# Patient Record
Sex: MALE | Race: WHITE | NOT HISPANIC OR LATINO | Employment: OTHER | URBAN - METROPOLITAN AREA
[De-identification: names, ages, dates, MRNs, and addresses within clinical notes are randomized per-mention and may not be internally consistent; named-entity substitution may affect disease eponyms.]

---

## 2019-01-22 ENCOUNTER — OFFICE VISIT (OUTPATIENT)
Dept: GASTROENTEROLOGY | Facility: CLINIC | Age: 69
End: 2019-01-22
Payer: MEDICARE

## 2019-01-22 VITALS
HEIGHT: 71 IN | SYSTOLIC BLOOD PRESSURE: 130 MMHG | WEIGHT: 178.13 LBS | RESPIRATION RATE: 18 BRPM | DIASTOLIC BLOOD PRESSURE: 79 MMHG | BODY MASS INDEX: 24.94 KG/M2 | HEART RATE: 87 BPM

## 2019-01-22 DIAGNOSIS — Z86.010 HISTORY OF COLON POLYPS: ICD-10-CM

## 2019-01-22 DIAGNOSIS — K92.1 HEMATOCHEZIA: Primary | ICD-10-CM

## 2019-01-22 DIAGNOSIS — Z92.3 HISTORY OF RADIATION THERAPY: ICD-10-CM

## 2019-01-22 PROCEDURE — 99203 PR OFFICE/OUTPT VISIT, NEW, LEVL III, 30-44 MIN: ICD-10-PCS | Mod: S$GLB,,, | Performed by: NURSE PRACTITIONER

## 2019-01-22 PROCEDURE — 99999 PR PBB SHADOW E&M-NEW PATIENT-LVL III: CPT | Mod: PBBFAC,,, | Performed by: NURSE PRACTITIONER

## 2019-01-22 PROCEDURE — 99999 PR PBB SHADOW E&M-NEW PATIENT-LVL III: ICD-10-PCS | Mod: PBBFAC,,, | Performed by: NURSE PRACTITIONER

## 2019-01-22 PROCEDURE — 99203 OFFICE O/P NEW LOW 30 MIN: CPT | Mod: S$GLB,,, | Performed by: NURSE PRACTITIONER

## 2019-01-22 NOTE — PROGRESS NOTES
Subjective:       Patient ID: Josafat Rodrigues is a 69 y.o. male Body mass index is 24.84 kg/m².    Chief Complaint: Follow-up (consult, blood in stool for years, hx of colon polyps)    This patient is new to me.    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 Primary symptoms do not include fever, weight loss, fatigue, abdominal pain, nausea, vomiting, diarrhea, melena, hematemesis or dysuria.   The illness does not include chills, dysphagia, odynophagia or constipation. Associated symptoms comments: Bowel movements once daily of formed stool. Significant associated medical issues include hemorrhoids (denies any problems currently). Associated medical issues do not include bowel resection.     Review of Systems   Constitutional: Negative for appetite change, chills, fatigue, fever and weight loss.   HENT: Negative for sore throat and trouble swallowing.    Respiratory: Negative for cough, choking and shortness of breath.    Cardiovascular: Negative for chest pain.   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 Negative for abdominal pain, constipation, diarrhea, dysphagia, hematemesis, melena, nausea, rectal pain and vomiting.   Genitourinary: Negative for difficulty urinating, dysuria and flank pain.   Neurological: Negative for weakness.       Past Medical History:   Diagnosis Date    Cancer     testicular tumor- had radiation    GERD (gastroesophageal reflux disease)      Past Surgical History:   Procedure Laterality Date     COLONOSCOPY  02/02/2004    Dr. Blanchard, in legacy: rectal colon polyp removed, erythema to rectum; biopsy report in legacy    HERNIA REPAIR      TONSILLECTOMY       Family History   Problem Relation Age of Onset    Arthritis Mother     Hearing loss Mother     Vision loss Mother     Cancer Father     Colon cancer Neg Hx     Ulcerative colitis Neg Hx     Stomach cancer Neg Hx     Esophageal cancer Neg Hx     Crohn's disease Neg Hx      Wt Readings from Last 10 Encounters:   01/22/19 80.8 kg (178 lb 2.1 oz)   04/08/15 79.8 kg (176 lb)   04/03/15 79.8 kg (176 lb)   11/14/13 80.7 kg (178 lb)   09/28/12 76.4 kg (168 lb 6.4 oz)     Objective:      Physical Exam   Constitutional: He is oriented to person, place, and time. He appears well-developed and well-nourished. No distress.   HENT:   Mouth/Throat: Oropharynx is clear and moist and mucous membranes are normal. No oral lesions. No oropharyngeal exudate.   Eyes: Conjunctivae are normal. Pupils are equal, round, and reactive to light. No scleral icterus.   Cardiovascular: Normal rate.   Pulmonary/Chest: Effort normal and breath sounds normal. No respiratory distress. He has no wheezes.   Abdominal: Soft. Normal appearance and bowel sounds are normal. He exhibits no distension, no abdominal bruit and no mass. There is no tenderness. There is no rigidity, no rebound, no guarding, no tenderness at McBurney's point and negative Patel's sign.   Patient declined rectal exam.   Neurological: He is alert and oriented to person, place, and time.   Skin: Skin is warm and dry. No rash noted. He is not diaphoretic. No erythema. No pallor.   Non-jaundiced   Psychiatric: He has a normal mood and affect. His behavior is normal. Judgment and thought content normal.   Nursing note and vitals reviewed.      Assessment:       1. Hematochezia    2. History of colon polyps    3. History of radiation therapy        Plan:       Hematochezia  -     CBC auto differential; Future;  Expected date: 01/22/2019  - discussed about different etiologies that can cause rectal bleeding, such as diverticulosis, polyps, colon mass, colon inflammation or infection, anal fissure or hemorrhoids.   - schedule Colonoscopy, discussed procedure with the patient, verbalized understanding   - You may resume normal activity as long as you feel well.  - Avoid/minimize aspirin and anti-inflammatory drugs such as ibuprofen (Advil, Motrin) and naproxen (Aleve and Naprosyn).  - Avoid alcohol.    History of colon polyps  - schedule Colonoscopy, discussed procedure with the patient, patient verbalized understanding    History of radiation therapy  - possible radiation proctitis  - schedule Colonoscopy, discussed procedure with the patient, patient verbalized understanding  Recommend follow-up with Primary Care Provider/urology for continued evaluation and management.    Follow-up in about 1 month (around 2/22/2019), or if symptoms worsen or fail to improve.      If no improvement in symptoms or symptoms worsen, call/follow-up at clinic or go to ER.

## 2019-01-22 NOTE — PATIENT INSTRUCTIONS
Lower GI Bleeding (Stable)  You have signs of blood in your stool. This is called rectal bleeding. The bleeding may have begun in another part of your gastrointestinal (GI) tract. If the blood is bright red, it is likely coming from the lower part of the GI tract. If the blood is black or dark, it might be coming from higher up in the GI tract. Very small amounts of GI bleeding may not be visible and can only be discovered during a test on your stool. Possible causes of lower GI bleeding include:  · Hemorrhoids  · Anal fissures  · Diverticulitis  · Inflammatory bowel disease (Crohn's disease or ulcerative colitis)  · Polyps (growths) in the intestine  Note: Iron supplements and medicines for diarrhea or upset stomach can cause black stools. Foods such as licorice and red beets can also discolor the stool and be mistaken for bleeding. These are not bleeding and are not a cause for alarm.  Home care  You have not lost a large amount of blood and your condition appears stable at this time. You may resume normal activity as long as you feel well.  Avoid NSAIDs, such as aspirin, ibuprofen, or naproxen. They can irritate the stomach and cause further bleeding. If you are taking these medicines for other medical reasons, talk to your healthcare provider before you stop them.   Follow-up care  Follow up with your healthcare provider as advised. Further tests may be needed to find the cause of your bleeding.  When to seek medical advice  Call your healthcare provider for any of the following:  · Large amount of rectal bleeding   · Increasing abdominal pain  · Weakness, dizziness  Call 911  Get emergency medical care if any of the following occur:  · Loss of consciousness  · Vomiting blood  Date Last Reviewed: 6/24/2015  © 0402-3620 [employer]. [address] [place] [address]. All rights reserved. This information is not intended as a substitute for professional medical care. Always follow your  healthcare professional's instructions.

## 2019-02-05 ENCOUNTER — ANESTHESIA (OUTPATIENT)
Dept: ENDOSCOPY | Facility: HOSPITAL | Age: 69
End: 2019-02-05
Payer: MEDICARE

## 2019-02-05 ENCOUNTER — HOSPITAL ENCOUNTER (OUTPATIENT)
Facility: HOSPITAL | Age: 69
Discharge: HOME | End: 2019-02-05
Attending: INTERNAL MEDICINE | Admitting: INTERNAL MEDICINE
Payer: MEDICARE

## 2019-02-05 ENCOUNTER — ANESTHESIA EVENT (OUTPATIENT)
Dept: ENDOSCOPY | Facility: HOSPITAL | Age: 69
End: 2019-02-05
Payer: MEDICARE

## 2019-02-05 DIAGNOSIS — K92.1 HEMATOCHEZIA: ICD-10-CM

## 2019-02-05 PROCEDURE — D9220A PRA ANESTHESIA: ICD-10-PCS | Mod: CRNA,,, | Performed by: NURSE ANESTHETIST, CERTIFIED REGISTERED

## 2019-02-05 PROCEDURE — 45380 COLONOSCOPY AND BIOPSY: CPT | Mod: 59,,, | Performed by: INTERNAL MEDICINE

## 2019-02-05 PROCEDURE — D9220A PRA ANESTHESIA: Mod: ANES,,, | Performed by: ANESTHESIOLOGY

## 2019-02-05 PROCEDURE — 45385 COLONOSCOPY W/LESION REMOVAL: CPT | Mod: ,,, | Performed by: INTERNAL MEDICINE

## 2019-02-05 PROCEDURE — 45385 COLONOSCOPY W/LESION REMOVAL: CPT | Performed by: INTERNAL MEDICINE

## 2019-02-05 PROCEDURE — 27201012 HC FORCEPS, HOT/COLD, DISP: Performed by: INTERNAL MEDICINE

## 2019-02-05 PROCEDURE — D9220A PRA ANESTHESIA: Mod: CRNA,,, | Performed by: NURSE ANESTHETIST, CERTIFIED REGISTERED

## 2019-02-05 PROCEDURE — 37000008 HC ANESTHESIA 1ST 15 MINUTES: Performed by: INTERNAL MEDICINE

## 2019-02-05 PROCEDURE — 45380 PR COLONOSCOPY,BIOPSY: ICD-10-PCS | Mod: 59,,, | Performed by: INTERNAL MEDICINE

## 2019-02-05 PROCEDURE — 45381 COLONOSCOPY SUBMUCOUS NJX: CPT | Performed by: INTERNAL MEDICINE

## 2019-02-05 PROCEDURE — 45381 COLONOSCOPY SUBMUCOUS NJX: CPT | Mod: 51,,, | Performed by: INTERNAL MEDICINE

## 2019-02-05 PROCEDURE — 37000009 HC ANESTHESIA EA ADD 15 MINS: Performed by: INTERNAL MEDICINE

## 2019-02-05 PROCEDURE — 45380 COLONOSCOPY AND BIOPSY: CPT | Performed by: INTERNAL MEDICINE

## 2019-02-05 PROCEDURE — 88305 TISSUE EXAM BY PATHOLOGIST: CPT | Mod: 59 | Performed by: PATHOLOGY

## 2019-02-05 PROCEDURE — 27201089 HC SNARE, DISP (ANY): Performed by: INTERNAL MEDICINE

## 2019-02-05 PROCEDURE — 63600175 PHARM REV CODE 636 W HCPCS: Performed by: NURSE ANESTHETIST, CERTIFIED REGISTERED

## 2019-02-05 PROCEDURE — 25000003 PHARM REV CODE 250: Performed by: INTERNAL MEDICINE

## 2019-02-05 PROCEDURE — D9220A PRA ANESTHESIA: ICD-10-PCS | Mod: ANES,,, | Performed by: ANESTHESIOLOGY

## 2019-02-05 PROCEDURE — 88305 TISSUE SPECIMEN TO PATHOLOGY - SURGERY: ICD-10-PCS | Mod: 26,,, | Performed by: PATHOLOGY

## 2019-02-05 PROCEDURE — 45385 PR COLONOSCOPY,REMV LESN,SNARE: ICD-10-PCS | Mod: ,,, | Performed by: INTERNAL MEDICINE

## 2019-02-05 PROCEDURE — 45381 PR COLONOSCPY,FLEX,W/DIR SUBMUC INJECT: ICD-10-PCS | Mod: 51,,, | Performed by: INTERNAL MEDICINE

## 2019-02-05 PROCEDURE — 27201028 HC NEEDLE, SCLERO: Performed by: INTERNAL MEDICINE

## 2019-02-05 RX ORDER — LIDOCAINE HYDROCHLORIDE 20 MG/ML
INJECTION, SOLUTION EPIDURAL; INFILTRATION; INTRACAUDAL; PERINEURAL
Status: DISCONTINUED
Start: 2019-02-05 | End: 2019-02-05 | Stop reason: HOSPADM

## 2019-02-05 RX ORDER — SODIUM CHLORIDE 9 MG/ML
INJECTION, SOLUTION INTRAVENOUS CONTINUOUS
Status: DISCONTINUED | OUTPATIENT
Start: 2019-02-05 | End: 2019-02-05 | Stop reason: HOSPADM

## 2019-02-05 RX ORDER — PROPOFOL 10 MG/ML
VIAL (ML) INTRAVENOUS
Status: DISCONTINUED | OUTPATIENT
Start: 2019-02-05 | End: 2019-02-05

## 2019-02-05 RX ORDER — LIDOCAINE HCL/PF 100 MG/5ML
SYRINGE (ML) INTRAVENOUS
Status: DISCONTINUED | OUTPATIENT
Start: 2019-02-05 | End: 2019-02-05

## 2019-02-05 RX ORDER — PROPOFOL 10 MG/ML
INJECTION, EMULSION INTRAVENOUS
Status: COMPLETED
Start: 2019-02-05 | End: 2019-02-05

## 2019-02-05 RX ADMIN — PROPOFOL 30 MG: 10 INJECTION, EMULSION INTRAVENOUS at 10:02

## 2019-02-05 RX ADMIN — LIDOCAINE HYDROCHLORIDE 75 MG: 20 INJECTION, SOLUTION INTRAVENOUS at 09:02

## 2019-02-05 RX ADMIN — PROPOFOL 30 MG: 10 INJECTION, EMULSION INTRAVENOUS at 09:02

## 2019-02-05 RX ADMIN — PROPOFOL 80 MG: 10 INJECTION, EMULSION INTRAVENOUS at 09:02

## 2019-02-05 RX ADMIN — SODIUM CHLORIDE: 0.9 INJECTION, SOLUTION INTRAVENOUS at 09:02

## 2019-02-05 RX ADMIN — PROPOFOL 50 MG: 10 INJECTION, EMULSION INTRAVENOUS at 09:02

## 2019-02-05 NOTE — PLAN OF CARE
Vss, bobby po fluids, denies pain, ambulates easily. IV removed, catheter intact. Discharge instructions provided and states understanding. States ready to go home.  Discharged from facility with family.

## 2019-02-05 NOTE — ANESTHESIA PREPROCEDURE EVALUATION
02/05/2019  Josafat Rodrigues is a 69 y.o., male.    Anesthesia Evaluation    I have reviewed the Patient Summary Reports.    I have reviewed the Nursing Notes.   I have reviewed the Medications.     Review of Systems  Anesthesia Hx:  Denies Family Hx of Anesthesia complications.   Denies Personal Hx of Anesthesia complications.   Hepatic/GI:   Bowel Prep. GERD Polyps       Physical Exam  General:  Well nourished    Airway/Jaw/Neck:  Airway Findings: Mouth Opening: Normal Tongue: Normal  General Airway Assessment: Adult  Mallampati: III  Improves to II with phonation.  TM Distance: Normal, at least 6 cm  Jaw/Neck Findings:  Neck ROM: Normal ROM      Dental:  Dental Findings: In tact   Chest/Lungs:  Chest/Lungs Clear    Heart/Vascular:  Heart Findings: Normal            Anesthesia Plan  Type of Anesthesia, risks & benefits discussed:  Anesthesia Type:  general  Patient's Preference:   Intra-op Monitoring Plan: standard ASA monitors  Intra-op Monitoring Plan Comments:   Post Op Pain Control Plan:   Post Op Pain Control Plan Comments:   Induction:   IV  Beta Blocker:  Patient is not currently on a Beta-Blocker (No further documentation required).       Informed Consent: Patient understands risks and agrees with Anesthesia plan.  Questions answered. Anesthesia consent signed with patient.  ASA Score: 2     Day of Surgery Review of History & Physical:    H&P update referred to the provider.         Ready For Surgery From Anesthesia Perspective.

## 2019-02-05 NOTE — DISCHARGE INSTRUCTIONS
No Aspirin, Aleve, Ibuprofen or blood thinners for 14 days. Tylenol is ok.    High-Fiber Diet  Fiber is in fruits, vegetables, cereals, and grains. Fiber passes through your body undigested. A high-fiber diet helps food move through your intestinal tract. The added bulk is helpful in preventing constipation. In people with diverticulosis, fiber helps clean out the pouches along the colon wall. It also prevents new pouches from forming. A high-fiber diet reduces the risk of colon cancer. It also lowers blood cholesterol and prevents high blood sugar in people with diabetes.    The fiber-rich foods listed below should be part of your diet. If you are not used to high-fiber foods, start with 1 or 2 foods from this list. Every 3 to 4 days add a new one to your diet. Do this until you are eating 4 high-fiber foods per day. This should give you 20 to 35 grams of fiber a day. It is also important to drink a lot of water when you are on this diet. You should have 6 to 8 glasses of water a day. Water makes the fiber swell and increases the benefit.  Foods high in dietary fiber  The following foods are high in dietary fiber:  · Breads. Breads made with 100% whole-wheat flour; gold, wheat, or rye crackers; whole-grain tortillas, bran muffins.  · Cereals. Whole-grain and bran cereals with bran (shredded wheat, wheat flakes, raisin bran, corn bran); oatmeal, rolled oats, granola, and brown rice.  · Fruits. Fresh fruits and their edible skins (pears, prunes, raisins, berries, apples, and apricots); bananas, citrus fruit, mangoes, pineapple; and prune juice.  · Nuts. Any nuts and seeds.  · Vegetables. Best served raw or lightly cooked. All types, especially: green peas, celery, eggplant, potatoes, spinach, broccoli, [place] sprouts, winter squash, carrots, cauliflower, soybeans, lentils, and fresh and dried beans of all kinds.  · Other. Popcorn, any spices.  Date Last Reviewed: 8/1/2016  © 0941-0743 [employer]  [address] [place] [address]. All rights reserved. This information is not intended as a substitute for professional medical care. Always follow your healthcare professional's instructions.        Diverticulosis    Diverticulosis means that small pouches have formed in the wall of your large intestine (colon). Most often, this problem causes no symptoms and is common as people age. But the pouches in the colon are at risk of becoming infected. When this happens, the condition is called diverticulitis. Although most people with diverticulosis never develop diverticulitis, it is still not uncommon. Rectal bleeding can also occur and in less common situations, a type of colon inflammation called colitis.  While most people do not have symptoms, some people with diverticulosis may have:  · Abdominal cramps and pain  · Bloating  · Constipation  · Change in bowel habits  Causes  The exact cause of diverticulosis (and diverticulitis) has not been proved, but a few things are associated with the condition:  · Low-fiber diet  · Constipation  · Lack of exercise  Your healthcare provider will talk with you about how to manage your condition. Diet changes may be all that are needed to help control diverticulosis and prevent progression to diverticulitis. If you develop diverticulitis, you will likely need other treatments.  Home care  You may be told to take fiber supplements daily. Fiber adds bulk to the stool so that it passes through the colon more easily. Stool softeners may be recommended. You may also be given medications for pain relief. Be sure to take all medications as directed.  In the past, people were told to avoid corn, nuts, and seeds. This is no longer necessary.  Follow these guidelines when caring for yourself at home:  · Eat unprocessed foods that are high in fiber. Whole grains, fruits, and vegetables are good choices.  · Drink 6 to 8 glasses of water every day unless your healthcare provider  has you limit how much fluid you should have.  · Watch for changes in your bowel movements. Tell your provider if you notice any changes.  · Begin an exercise program. Ask your provider how to get started. Generally, walking is the best.  · Get plenty of rest and sleep.  Follow-up care  Follow up with your healthcare provider, or as advised. Regular visits may be needed to check on your health. Sometimes special procedures such as colonoscopy, are needed after an episode of diverticulitis or blooding. Be sure to keep all your appointments.  If a stool sample was taken, or cultures were done, you should be told if they are positive, or if your treatment needs to be changed. You can call as directed for the results.  If X-rays were done, a radiologist will look at them. You will be told if there is a change in your treatment.  If antibiotics were prescribed, be sure to finish them all.  When to seek medical advice  Call your healthcare provider right away if any of these occur:  · Fever of 100.4°F (38°C) or higher, or as directed by your healthcare provider  · Severe cramps in the lower left side of the abdomen or pain that is getting worse  · Tenderness in the lower left side of the abdomen or worsening pain throughout the abdomen  · Diarrhea or constipation that doesn't get better within 24 hours  · Nausea and vomiting  · Bleeding from the rectum  Call 911  Call emergency services if any of the following occur:  · Trouble breathing  · Confusion  · Very drowsy or trouble awakening  · Fainting or loss of consciousness  · Rapid heart rate  · Chest pain  Date Last Reviewed: 12/30/2015  © 6022-5716 [employer]. [address] [place] [address], [place] [address]. All rights reserved. This information is not intended as a substitute for professional medical care. Always follow your healthcare professional's instructions.        Understanding Colon and Rectal Polyps    The colon (also called the large intestine) is a  muscular tube that forms the last part of the digestive tract. It absorbs water and stores food waste. The colon is about 4 to 6 feet long. The rectum is the last 6 inches of the colon. The colon and rectum have a smooth lining composed of millions of cells. Changes in these cells can lead to growths in the colon that can become cancerous and should be removed. Multiple tests are available to screen for colon cancer, but the colonoscopy is the most recommended test. During colonoscopy, these polyps can be removed. How often you need this test depends on many things including your condition, your family history, symptoms, and what the findings were at the previous colonoscopy.   When the colon lining changes  Changes that happen in the cells that line the colon or rectum can lead to growths called polyps. Over a period of years, polyps can turn cancerous. Removing polyps early may prevent cancer from ever forming.  Polyps  Polyps are fleshy clumps of tissue that form on the lining of the colon or rectum. Small polyps are usually benign (not cancerous). However, over time, cells in a polyp can change and become cancerous. Certain types of polyps known as adenomatous polyps are premalignant. The risk for invasive cancer increases with the size of the polyp and certain cell and gene features. This means that they can become cancerous if they're not removed. Hyperplastic polyps are benign. They can grow quite large and not turn cancerous.   Cancer  Almost all colorectal cancers start when polyp cells begin growing abnormally. As a cancerous tumor grows, it may involve more and more of the colon or rectum. In time, cancer can also grow beyond the colon or rectum and spread to nearby organs or to glands called lymph nodes. The cells can also travel to other parts of the body. This is known as metastasis. The earlier a cancerous tumor is removed, the better the chance of preventing its spread.    Date Last Reviewed:  "8/1/2016  © 1605-3444 [employer]. [address] [place] [address], [place] [address]. All rights reserved. This information is not intended as a substitute for professional medical care. Always follow your healthcare professional's instructions.      Discharge Instructions: After Your Surgery/Procedure  Youve just had surgery. During surgery you were given medicine called anesthesia to keep you relaxed and free of pain. After surgery you may have some pain or nausea. This is common. Here are some tips for feeling better and getting well after surgery.     Stay on schedule with your medication.   Going home  Your doctor or nurse will show you how to take care of yourself when you go home. He or she will also answer your questions. Have an adult family member or friend drive you home.      For your safety we recommend these precaution for the first 24 hours after your procedure:  · Do not drive or use heavy equipment.  · Do not make important decisions or sign legal papers.  · Do not drink alcohol.  · Have someone stay with you, if needed. He or she can watch for problems and help keep you safe.  · Your concentration, balance, coordination, and judgement may be impaired for many hours after anesthesia.  Use caution when ambulating or standing up.     · You may feel weak and "washed out" after anesthesia and surgery.      Subtle residual effects of general anesthesia or sedation with regional / local anesthesia can last more than 24 hours.  Rest for the remainder of the day or longer if your Doctor/Surgeon has advised you to do so.  Although you may feel normal within the first 24 hours, your reflexes and mental ability may be impaired without you realizing it.  You may feel dizzy, lightheaded or sleepy for 24 hours or longer.      Be sure to go to all follow-up visits with your doctor. And rest after your surgery for as long as your doctor tells you to.  Coping with pain  If you have pain after surgery, pain " medicine will help you feel better. Take it as told, before pain becomes severe. Also, ask your doctor or pharmacist about other ways to control pain. This might be with heat, ice, or relaxation. And follow any other instructions your surgeon or nurse gives you.  Tips for taking pain medicine  To get the best relief possible, remember these points:  · Pain medicines can upset your stomach. Taking them with a little food may help.  · Most pain relievers taken by mouth need at least 20 to 30 minutes to start to work.  · Taking medicine on a schedule can help you remember to take it. Try to time your medicine so that you can take it before starting an activity. This might be before you get dressed, go for a walk, or sit down for dinner.  · Constipation is a common side effect of pain medicines. Call your doctor before taking any medicines such as laxatives or stool softeners to help ease constipation. Also ask if you should skip any foods. Drinking lots of fluids and eating foods such as fruits and vegetables that are high in fiber can also help. Remember, do not take laxatives unless your surgeon has prescribed them.  · Drinking alcohol and taking pain medicine can cause dizziness and slow your breathing. It can even be deadly. Do not drink alcohol while taking pain medicine.  · Pain medicine can make you react more slowly to things. Do not drive or run machinery while taking pain medicine.  Your health care provider may tell you to take acetaminophen to help ease your pain. Ask him or her how much you are supposed to take each day. Acetaminophen or other pain relievers may interact with your prescription medicines or other over-the-counter (OTC) drugs. Some prescription medicines have acetaminophen and other ingredients. Using both prescription and OTC acetaminophen for pain can cause you to overdose. Read the labels on your OTC medicines with care. This will help you to clearly know the list of ingredients, how much  to take, and any warnings. It may also help you not take too much acetaminophen. If you have questions or do not understand the information, ask your pharmacist or health care provider to explain it to you before you take the OTC medicine.  Managing nausea  Some people have an upset stomach after surgery. This is often because of anesthesia, pain, or pain medicine, or the stress of surgery. These tips will help you handle nausea and eat healthy foods as you get better. If you were on a special food plan before surgery, ask your doctor if you should follow it while you get better. These tips may help:  · Do not push yourself to eat. Your body will tell you when to eat and how much.  · Start off with clear liquids and soup. They are easier to digest.  · Next try semi-solid foods, such as mashed potatoes, applesauce, and gelatin, as you feel ready.  · Slowly move to solid foods. Dont eat fatty, rich, or spicy foods at first.  · Do not force yourself to have 3 large meals a day. Instead eat smaller amounts more often.  · Take pain medicines with a small amount of solid food, such as crackers or toast, to avoid nausea.     Call your surgeon if  · You still have pain an hour after taking medicine. The medicine may not be strong enough.  · You feel too sleepy, dizzy, or groggy. The medicine may be too strong.  · You have side effects like nausea, vomiting, or skin changes, such as rash, itching, or hives.       If you have obstructive sleep apnea  You were given anesthesia medicine during surgery to keep you comfortable and free of pain. After surgery, you may have more apnea spells because of this medicine and other medicines you were given. The spells may last longer than usual.   At home:  · Keep using the continuous positive airway pressure (CPAP) device when you sleep. Unless your health care provider tells you not to, use it when you sleep, day or night. CPAP is a common device used to treat obstructive sleep  apnea.  · Talk with your provider before taking any pain medicine, muscle relaxants, or sedatives. Your provider will tell you about the possible dangers of taking these medicines.  © 9670-9317 [employer] [place]. [address] [place] [address], [place] [address]. All rights reserved. This information is not intended as a substitute for professional medical care. Always follow your healthcare professional's instructions.

## 2019-02-05 NOTE — INTERVAL H&P NOTE
The patient has been examined and the H&P has been reviewed:    I concur with the findings and no changes have occurred since H&P was written.    Anesthesia/Surgery risks, benefits and alternative options discussed and understood by patient/family.          Active Hospital Problems    Diagnosis  POA    Hematochezia [K92.1]  Yes      Resolved Hospital Problems   No resolved problems to display.

## 2019-02-05 NOTE — TRANSFER OF CARE
"Anesthesia Transfer of Care Note    Patient: Josafat Rodrigues    Procedure(s) Performed: Procedure(s) (LRB):  COLONOSCOPY (N/A)    Patient location: PACU    Anesthesia Type: general    Transport from OR: Transported from OR on room air with adequate spontaneous ventilation    Post pain: adequate analgesia    Post assessment: no apparent anesthetic complications    Post vital signs: stable    Level of consciousness: awake    Nausea/Vomiting: no nausea/vomiting    Complications: none    Transfer of care protocol was followed      Last vitals:   Visit Vitals  /76 (BP Location: Left arm, Patient Position: Lying)   Pulse 79   Temp 36.9 °C (98.4 °F) (Temporal)   Resp 16   Ht 5' 11" (1.803 m)   Wt 79.4 kg (175 lb)   SpO2 96%   BMI 24.41 kg/m²     "

## 2019-02-05 NOTE — PLAN OF CARE
Have you had a colonoscopy LESS THAN 3 years ago?  No    * If YES, answer these questions*:    1. Did patient have a prior colonic polyp in a previous surveillance/diagnostic colonoscopy and is 18 years or older on date of encounter?    2. Documentation of < 3 year interval since the patients last colonoscopy due to medical reasons (eg., last colonoscopy incomplete, last colonoscopy had inadequate prep, piecemeal removal of adenomas, or last colonoscopy found > 10 adenomas) ?

## 2019-02-05 NOTE — ANESTHESIA POSTPROCEDURE EVALUATION
"Anesthesia Post Evaluation    Patient: Josafat Rodrigues    Procedure(s) Performed: Procedure(s) (LRB):  COLONOSCOPY (N/A)    Final Anesthesia Type: general  Patient location during evaluation: PACU  Patient participation: Yes- Able to Participate  Level of consciousness: awake and alert  Post-procedure vital signs: reviewed and stable  Pain management: adequate  Airway patency: patent  PONV status at discharge: No PONV  Anesthetic complications: no      Cardiovascular status: blood pressure returned to baseline  Respiratory status: unassisted  Hydration status: euvolemic  Follow-up not needed.        Visit Vitals  /71   Pulse 77   Temp 36.9 °C (98.5 °F) (Temporal)   Resp 16   Ht 5' 11" (1.803 m)   Wt 79.4 kg (175 lb)   SpO2 96%   BMI 24.41 kg/m²       Pain/Jackson Score: No Data Recorded      "

## 2019-02-05 NOTE — [employer] PATIENT INSTRUCTIONS
Discharge Summary/Instructions after an Endoscopic Procedure  Patient Name: Josafat Rodrigues  Patient MRN: [id number]  Patient YOB: 1950  Tuesday, February 05, 2019  Rita Hutchins MD  RESTRICTIONS:  During your procedure today, you received medications for sedation.  These   medications may affect your judgment, balance and coordination.  Therefore,   for 24 hours, you have the following restrictions:   - DO NOT drive a car, operate machinery, make legal/financial decisions,   sign important papers or drink alcohol.    ACTIVITY:  Today: no heavy lifting, straining or running due to procedural   sedation/anesthesia.  The following day: return to full activity including work.  DIET:  Eat and drink normally unless instructed otherwise.     TREATMENT FOR COMMON SIDE EFFECTS:  - Mild abdominal pain, nausea, belching, bloating or excessive gas:  rest,   eat lightly and use a heating pad.  - Sore Throat: treat with throat lozenges and/or gargle with warm salt   water.  - Because air was used during the procedure, expelling large amounts of air   from your rectum or belching is normal.  - If a bowel prep was taken, you may not have a bowel movement for 1-3 days.    This is normal.  SYMPTOMS TO WATCH FOR AND REPORT TO YOUR PHYSICIAN:  1. Abdominal pain or bloating, other than gas cramps.  2. Chest pain.  3. Back pain.  4. Signs of infection such as: chills or fever occurring within 24 hours   after the procedure.  5. Rectal bleeding, which would show as bright red, maroon, or black stools.   (A tablespoon of blood from the rectum is not serious, especially if   hemorrhoids are present.)  6. Vomiting.  7. Weakness or dizziness.  GO DIRECTLY TO THE NEAREST EMERGENCY ROOM IF YOU HAVE ANY OF THE FOLLOWING:      Difficulty breathing              Chills and/or fever over 101 F   Persistent vomiting and/or vomiting blood   Severe abdominal pain   Severe chest pain   Black, tarry stools   Bleeding- more than one  tablespoon   Any other symptom or condition that you feel may need urgent attention  Your doctor recommends these additional instructions:  If any biopsies were taken, your doctors clinic will contact you in 1 to 2   weeks with any results.  - Discharge patient to home (with escort).   - Patient has a contact number available for emergencies.  The signs and   symptoms of potential delayed complications were discussed with the   patient.  Return to normal activities tomorrow.  Written discharge   instructions were provided to the patient.   - Resume previous diet.   - Continue present medications.   - Await pathology results.   - Repeat colonoscopy date to be determined after pending pathology results   are reviewed for surveillance based on pathology results.   -High fiber diet + stool softener daily for hemorrhoidal disease  - Return to nurse practitioner PRN.  For questions, problems or results please call your physician - Rita Hutchins MD at Work:  [phone].  [facility], EMERGENCY ROOM PHONE NUMBER: [phone]  IF A COMPLICATION OR EMERGENCY SITUATION ARISES AND YOU ARE UNABLE TO REACH   YOUR PHYSICIAN - GO DIRECTLY TO THE EMERGENCY ROOM.  Rita Hutchins MD  2/5/2019 10:29:11 AM  This report has been verified and signed electronically.  [employer]

## 2019-02-06 VITALS
RESPIRATION RATE: 16 BRPM | HEIGHT: 71 IN | HEART RATE: 77 BPM | OXYGEN SATURATION: 96 % | DIASTOLIC BLOOD PRESSURE: 71 MMHG | SYSTOLIC BLOOD PRESSURE: 113 MMHG | TEMPERATURE: 99 F | BODY MASS INDEX: 24.5 KG/M2 | WEIGHT: 175 LBS

## 2019-02-21 ENCOUNTER — TELEPHONE (OUTPATIENT)
Dept: GASTROENTEROLOGY | Facility: CLINIC | Age: 69
End: 2019-02-21

## 2019-02-21 NOTE — TELEPHONE ENCOUNTER
----- Message from Dayana Daily sent at 2/21/2019 10:18 AM CST -----  Contact: wife  Wife - Digna Rodrigues - [phone] (home) or [phone] (cell) is calling for patient/he had a colonoscopy on 02 05 19 at [facility] and has not received any results/calling for results

## 2019-02-21 NOTE — TELEPHONE ENCOUNTER
Returned call to pt's wife. She was asking about the pt's path results. Informed her that the final results have not been released as of yet. Will contact them once I receive results.

## 2019-03-08 ENCOUNTER — TELEPHONE (OUTPATIENT)
Dept: GASTROENTEROLOGY | Facility: CLINIC | Age: 69
End: 2019-03-08

## 2019-03-08 DIAGNOSIS — Z86.010 HISTORY OF COLON POLYPS: Primary | ICD-10-CM

## 2019-03-08 NOTE — TELEPHONE ENCOUNTER
----- Message from Jesse Reed sent at 3/8/2019  9:49 AM CST -----  Contact: Patient  Type:  Sooner Apoointment Request    Caller is requesting a sooner appointment.  Caller declined first available appointment listed below.  Caller will not accept being placed on the waitlist and is requesting a message be sent to doctor.    Name of Caller:  Patient  When is the first available appointment?  4/3/19  Symptoms:  6 week post op follow up  Best Call Back Number:  [phone]  Additional Information:  Patient had colonoscopy on 2/5/19

## 2019-03-18 ENCOUNTER — TELEPHONE (OUTPATIENT)
Dept: GASTROENTEROLOGY | Facility: CLINIC | Age: 69
End: 2019-03-18

## 2019-03-18 NOTE — TELEPHONE ENCOUNTER
----- Message from Jil Orellana sent at 3/18/2019  3:55 PM CDT -----  Contact: wife Digna [phone]  Please call her to verify his instructions for the test on Wednesday.  Thank you!

## 2019-03-20 ENCOUNTER — HOSPITAL ENCOUNTER (OUTPATIENT)
Facility: HOSPITAL | Age: 69
Discharge: HOME | End: 2019-03-20
Attending: INTERNAL MEDICINE | Admitting: INTERNAL MEDICINE
Payer: MEDICARE

## 2019-03-20 ENCOUNTER — ANESTHESIA EVENT (OUTPATIENT)
Dept: ENDOSCOPY | Facility: HOSPITAL | Age: 69
End: 2019-03-20
Payer: MEDICARE

## 2019-03-20 ENCOUNTER — ANESTHESIA (OUTPATIENT)
Dept: ENDOSCOPY | Facility: HOSPITAL | Age: 69
End: 2019-03-20
Payer: MEDICARE

## 2019-03-20 DIAGNOSIS — Z86.010 HISTORY OF COLON POLYPS: ICD-10-CM

## 2019-03-20 PROBLEM — Z86.0100 HISTORY OF COLON POLYPS: Status: ACTIVE | Noted: 2019-03-20

## 2019-03-20 PROCEDURE — 25000003 PHARM REV CODE 250: Performed by: INTERNAL MEDICINE

## 2019-03-20 PROCEDURE — D9220A PRA ANESTHESIA: Mod: CRNA,,, | Performed by: NURSE ANESTHETIST, CERTIFIED REGISTERED

## 2019-03-20 PROCEDURE — 45331 SIGMOIDOSCOPY AND BIOPSY: CPT | Mod: PT,,, | Performed by: INTERNAL MEDICINE

## 2019-03-20 PROCEDURE — D9220A PRA ANESTHESIA: ICD-10-PCS | Mod: ANES,,, | Performed by: ANESTHESIOLOGY

## 2019-03-20 PROCEDURE — 37000008 HC ANESTHESIA 1ST 15 MINUTES: Performed by: INTERNAL MEDICINE

## 2019-03-20 PROCEDURE — D9220A PRA ANESTHESIA: ICD-10-PCS | Mod: CRNA,,, | Performed by: NURSE ANESTHETIST, CERTIFIED REGISTERED

## 2019-03-20 PROCEDURE — 88305 TISSUE EXAM BY PATHOLOGIST: CPT | Performed by: PATHOLOGY

## 2019-03-20 PROCEDURE — 27201012 HC FORCEPS, HOT/COLD, DISP: Performed by: INTERNAL MEDICINE

## 2019-03-20 PROCEDURE — 45331 PR SIGMOIDOSCOPY,BIOPSY: ICD-10-PCS | Mod: PT,,, | Performed by: INTERNAL MEDICINE

## 2019-03-20 PROCEDURE — D9220A PRA ANESTHESIA: Mod: ANES,,, | Performed by: ANESTHESIOLOGY

## 2019-03-20 PROCEDURE — 63600175 PHARM REV CODE 636 W HCPCS: Performed by: NURSE ANESTHETIST, CERTIFIED REGISTERED

## 2019-03-20 PROCEDURE — 45331 SIGMOIDOSCOPY AND BIOPSY: CPT | Performed by: INTERNAL MEDICINE

## 2019-03-20 PROCEDURE — 88305 TISSUE SPECIMEN TO PATHOLOGY - SURGERY: ICD-10-PCS | Mod: 26,,, | Performed by: PATHOLOGY

## 2019-03-20 RX ORDER — LIDOCAINE HCL/PF 100 MG/5ML
SYRINGE (ML) INTRAVENOUS
Status: DISCONTINUED | OUTPATIENT
Start: 2019-03-20 | End: 2019-03-20

## 2019-03-20 RX ORDER — SODIUM CHLORIDE 9 MG/ML
INJECTION, SOLUTION INTRAVENOUS CONTINUOUS
Status: DISCONTINUED | OUTPATIENT
Start: 2019-03-20 | End: 2019-03-20 | Stop reason: HOSPADM

## 2019-03-20 RX ORDER — PROPOFOL 10 MG/ML
VIAL (ML) INTRAVENOUS
Status: DISCONTINUED | OUTPATIENT
Start: 2019-03-20 | End: 2019-03-20

## 2019-03-20 RX ADMIN — PROPOFOL 30 MG: 10 INJECTION, EMULSION INTRAVENOUS at 09:03

## 2019-03-20 RX ADMIN — LIDOCAINE HYDROCHLORIDE 75 MG: 20 INJECTION, SOLUTION INTRAVENOUS at 09:03

## 2019-03-20 RX ADMIN — SODIUM CHLORIDE: 0.9 INJECTION, SOLUTION INTRAVENOUS at 08:03

## 2019-03-20 RX ADMIN — PROPOFOL 100 MG: 10 INJECTION, EMULSION INTRAVENOUS at 09:03

## 2019-03-20 NOTE — TRANSFER OF CARE
"Anesthesia Transfer of Care Note    Patient: Josafat Rodrigues    Procedure(s) Performed: Procedure(s) (LRB):  SIGMOIDOSCOPY, FLEXIBLE (N/A)    Patient location: PACU    Anesthesia Type: general    Transport from OR: Transported from OR on room air with adequate spontaneous ventilation    Post pain: adequate analgesia    Post assessment: no apparent anesthetic complications    Post vital signs: stable    Level of consciousness: awake    Nausea/Vomiting: no nausea/vomiting    Complications: none    Transfer of care protocol was followed      Last vitals:   Visit Vitals  /81   Pulse 76   Temp 36.8 °C (98.2 °F) (Tympanic)   Resp (!) 23   Ht 5' 10" (1.778 m)   Wt 77.1 kg (170 lb)   SpO2 96%   BMI 24.39 kg/m²     "

## 2019-03-20 NOTE — ANESTHESIA POSTPROCEDURE EVALUATION
"Anesthesia Post Evaluation    Patient: Josafat Rodrigues    Procedure(s) Performed: Procedure(s) (LRB):  SIGMOIDOSCOPY, FLEXIBLE (N/A)    Final Anesthesia Type: general  Patient location during evaluation: PACU  Patient participation: Yes- Able to Participate  Level of consciousness: awake and alert  Post-procedure vital signs: reviewed and stable  Pain management: adequate  Airway patency: patent  PONV status at discharge: No PONV  Anesthetic complications: no      Cardiovascular status: hemodynamically stable  Respiratory status: unassisted and room air  Hydration status: euvolemic  Follow-up not needed.        Visit Vitals  /73   Pulse 74   Temp 36.8 °C (98.3 °F)   Resp 16   Ht 5' 10" (1.778 m)   Wt 77.1 kg (170 lb)   SpO2 96%   BMI 24.39 kg/m²       Pain/Jackson Score: Jackson Score: 10 (3/20/2019  9:23 AM)        "

## 2019-03-20 NOTE — NURSING
Have you had a colonoscopy LESS THAN 3 years ago?   * If YES, answer these questions*: Yes    1. Did patient have a prior colonic polyp in a previous surveillance/diagnostic colonoscopy and is 18 years or older on date of encounter?Yes  2. Documentation of < 3 year interval since the patients last colonoscopy due to medical reasons (eg., last colonoscopy incomplete, last colonoscopy had inadequate prep, piecemeal removal of adenomas, or last colonoscopy found > 10 adenomas) ?  Removal of large polyp. Recheck today.

## 2019-03-20 NOTE — PROVATION PATIENT INSTRUCTIONS
Discharge Summary/Instructions after an Endoscopic Procedure  Patient Name: Josafat Rodrigues  Patient MRN: [id number]  Patient YOB: 1950  Wednesday, March 20, 2019  Rita Hutchins MD  RESTRICTIONS:  During your procedure today, you received medications for sedation.  These   medications may affect your judgment, balance and coordination.  Therefore,   for 24 hours, you have the following restrictions:   - DO NOT drive a car, operate machinery, make legal/financial decisions,   sign important papers or drink alcohol.    ACTIVITY:  Today: no heavy lifting, straining or running due to procedural   sedation/anesthesia.  The following day: return to full activity including work.  DIET:  Eat and drink normally unless instructed otherwise.     TREATMENT FOR COMMON SIDE EFFECTS:  - Mild abdominal pain, nausea, belching, bloating or excessive gas:  rest,   eat lightly and use a heating pad.  - Sore Throat: treat with throat lozenges and/or gargle with warm salt   water.  - Because air was used during the procedure, expelling large amounts of air   from your rectum or belching is normal.  - If a bowel prep was taken, you may not have a bowel movement for 1-3 days.    This is normal.  SYMPTOMS TO WATCH FOR AND REPORT TO YOUR PHYSICIAN:  1. Abdominal pain or bloating, other than gas cramps.  2. Chest pain.  3. Back pain.  4. Signs of infection such as: chills or fever occurring within 24 hours   after the procedure.  5. Rectal bleeding, which would show as bright red, maroon, or black stools.   (A tablespoon of blood from the rectum is not serious, especially if   hemorrhoids are present.)  6. Vomiting.  7. Weakness or dizziness.  GO DIRECTLY TO THE NEAREST EMERGENCY ROOM IF YOU HAVE ANY OF THE FOLLOWING:      Difficulty breathing              Chills and/or fever over 101 F   Persistent vomiting and/or vomiting blood   Severe abdominal pain   Severe chest pain   Black, tarry stools   Bleeding- more than one  tablespoon   Any other symptom or condition that you feel may need urgent attention  Your doctor recommends these additional instructions:  If any biopsies were taken, your doctors clinic will contact you in 1 to 2   weeks with any results.  - Discharge patient to home (with escort).   - Patient has a contact number available for emergencies.  The signs and   symptoms of potential delayed complications were discussed with the   patient.  Return to normal activities tomorrow.  Written discharge   instructions were provided to the patient.   - Resume previous diet.  For questions, problems or results please call your physician - Rita Hutchins MD at Work:  [phone].  [facility], EMERGENCY ROOM PHONE NUMBER: [phone]  IF A COMPLICATION OR EMERGENCY SITUATION ARISES AND YOU ARE UNABLE TO REACH   YOUR PHYSICIAN - GO DIRECTLY TO THE EMERGENCY ROOM.  Rita Hutchins MD  3/20/2019 9:14:00 AM  This report has been verified and signed electronically.  [employer]

## 2019-03-20 NOTE — ANESTHESIA PREPROCEDURE EVALUATION
03/20/2019  Josafat Rodrigues is a 69 y.o., male.    Pre-op Assessment    I have reviewed the Patient Summary Reports.     I have reviewed the Nursing Notes.   I have reviewed the Medications.     Review of Systems  Anesthesia Hx:  Denies Family Hx of Anesthesia complications.   Denies Personal Hx of Anesthesia complications.   Hepatic/GI:   Bowel Prep. GERD Polyps       Physical Exam  General:  Well nourished    Airway/Jaw/Neck:  Airway Findings: Mouth Opening: Normal Tongue: Normal  General Airway Assessment: Adult  Mallampati: III  Improves to II with phonation.  TM Distance: Normal, at least 6 cm  Jaw/Neck Findings:  Neck ROM: Normal ROM     Eyes/Ears/Nose:  EYES/EARS/NOSE FINDINGS: Normal   Dental:  Dental Findings: In tact   Chest/Lungs:  Chest/Lungs Findings: Clear to auscultation, Normal Respiratory Rate     Heart/Vascular:  Heart Findings: Rate: Normal  Rhythm: Regular Rhythm        Mental Status:  Mental Status Findings:  Cooperative, Alert and Oriented         Anesthesia Plan  Type of Anesthesia, risks & benefits discussed:  Anesthesia Type:  general  Patient's Preference:   Intra-op Monitoring Plan: standard ASA monitors  Intra-op Monitoring Plan Comments:   Post Op Pain Control Plan:   Post Op Pain Control Plan Comments:   Induction:   IV  Beta Blocker:  Patient is not currently on a Beta-Blocker (No further documentation required).       Informed Consent: Patient understands risks and agrees with Anesthesia plan.  Questions answered. Anesthesia consent signed with patient.  ASA Score: 2     Day of Surgery Review of History & Physical:    H&P update referred to the provider.         Ready For Surgery From Anesthesia Perspective.

## 2019-03-20 NOTE — H&P
"[facility] Note    CC: Piecemeal polypectomy large TVA with HGD    HPI 69 y.o. male presents for flexible sigmoidoscopy due to piecemeal removal of large rectal TVA with HGD 6 weeks ago. He is without complaint.     Past Medical History:   Diagnosis Date    Cancer     testicular tumor- had radiation    GERD (gastroesophageal reflux disease)          Review of Systems  General ROS: negative for - chills, fever or weight loss  Cardiovascular ROS: no chest pain or dyspnea on exertion  Gastrointestinal ROS: no abdominal pain, no blood in stool     Physical Examination  BP (!) 150/76   Pulse 66   Temp 98.2 °F (36.8 °C) (Tympanic)   Resp 16   Ht 5' 10" (1.778 m)   Wt 77.1 kg (170 lb)   SpO2 96%   BMI 24.39 kg/m²   General appearance: alert, cooperative, no distress  HENT: Normocephalic, atraumatic, neck symmetrical, no nasal discharge, sclera anicteric   Lungs: clear to auscultation bilaterally, symmetric chest wall expansion bilaterally  Heart: regular rate and rhythm without rub; no displacement of the PMI   Abdomen: soft, nontender,nondistended  Extremities: extremities symmetric; no clubbing, cyanosis, or edema      Assessment:   69 y.o. male presents for flexible sigmoidoscopy to follow up piecemeal polypectomy of 25 mm TVA with HGD removed 6 weeks ago    Plan:  -Proceed to flexible sigmoidoscopy    Rita Hutchins MD  [facility]  [address], [address]  [place] [address]  Office: [phone]  Fax: [phone]  "

## 2019-03-20 NOTE — DISCHARGE INSTRUCTIONS
When Your Child Needs a Colonoscopy or Sigmoidoscopy  A colonoscopy is a test that allows the doctor to look inside the colon and rectum. A sigmoidoscopy is a shorter version of this test that only includes the lower part of the colon, called the sigmoid colon, and the rectum. The doctor may take tissue samples (biopsy), and check for tissue growths (polyps) or bleeding. The time needed for the test will depend on how clean the colon is, the condition, and the treatment necessary. In general, colonoscopy takes about 30 minutes and sigmoidoscopy takes about 10 to 15 minutes.     A colonoscope gives the doctor an inside view of the entire colon.         A sigmoidoscope gives the doctor an inside view of the rectum and sigmoid colon.      Before the Test  Your childs colon may need to be cleaned out before the test. Follow any instructions given by the doctor. These may include:  · Have your child drink a liquid bowel prep before the test.  · Switch your child to a clear liquid diet 1 to 2 days before the test.  · Give your child a laxative, a suppository, or enema the night before and on the day of the test.   Let the doctor know  For your childs safety, let the doctor know if your child has any of the following:  · Allergies to any medicine, sedative, or anesthesia  · Heart or lung problems  · Takes any medicines, especially aspirin   During the test  A colonoscopy or sigmoidoscopy is done by a doctor in an office, testing center, or hospital.  · You can stay with your child in the testing room until your child falls asleep or the test begins.  · Your child lies on an exam table on his or her left side.  · Your child is given a pain reliever and medicine that makes your child sleepy (sedative). This is done through an IV line. Or your child is given medicine that makes your child sleep (anesthesia) by facemask or IV. A trained nurse or anesthesiologist helps with this process and also monitors your child. Special  equipment is used to check your childs heart rate, blood pressure, and blood oxygen levels. Sigmoidoscopy usually doesnt require your child to be sedated.  · The doctor inserts a colonoscope or sigmoidoscope into your childs rectum and colon. This is a long, flexible tube with a camera and a light at the end. During a sigmoidoscopy, the scope will only advance through the sigmoid colon. It will sometimes travel a bit farther if the view is clear and the child is comfortable.  · Air is pushed through the scope to expand your childs lower GI tract. Water may also be used to clean the colon.  · Images of your childs colon are viewed on a screen as the scope moves along.  · The doctor may take tissue samples and remove any polyps that are found.  After the test  When the test is done, you can expect the following:  · If a sedative or anesthesia was given, your child is taken to a recovery room. It may take 1 to 2 hours for the medicine to wear off.  · Your child can return to his or her normal routine and diet right away, unless told otherwise.  · The doctor may discuss early results with you after the test. Youre given complete results when theyre ready.  Helping your child get ready  You can help your child by preparing in advance. How you do this depends on your childs needs. Try the following:  · Explain that the doctor will be testing the colon and rectum. Use brief and simple terms to describe the test. Younger children have shorter attention spans, so do this shortly before the test. Older children can be given more time to understand the test in advance.   · As best you can, describe how the test will feel. An IV may be inserted into the arm to give medicines. This may cause a little sting. Your child wont feel anything once the medicines take effect.  · If your child is not sedated then mild cramping is common.  · Allow your child to ask questions.  · Use play when helpful. This can involve  "role-playing with a childs favorite toy or object. It may help older children to see pictures of what happens during the test.   When to call your childs doctor  Call your doctor if your child has any of the following:  · Abdominal pain, nausea, or vomiting  · A large amount of blood in the stool right after the test or blood in the stool for several days  · Persistent fever over 100.4°F (38.0°C), or as directed by your healthcare provider   Date Last Reviewed: 8/1/2016 © 2000-2017 [employer]. [address] [place] [address]. All rights reserved. This information is not intended as a substitute for professional medical care. Always follow your healthcare professional's instructions.      Discharge Instructions: After Your Surgery/Procedure  Youve just had surgery. During surgery you were given medicine called anesthesia to keep you relaxed and free of pain. After surgery you may have some pain or nausea. This is common. Here are some tips for feeling better and getting well after surgery.     Stay on schedule with your medication.   Going home  Your doctor or nurse will show you how to take care of yourself when you go home. He or she will also answer your questions. Have an adult family member or friend drive you home.      For your safety we recommend these precaution for the first 24 hours after your procedure:  · Do not drive or use heavy equipment.  · Do not make important decisions or sign legal papers.  · Do not drink alcohol.  · Have someone stay with you, if needed. He or she can watch for problems and help keep you safe.  · Your concentration, balance, coordination, and judgement may be impaired for many hours after anesthesia.  Use caution when ambulating or standing up.     · You may feel weak and "washed out" after anesthesia and surgery.      Subtle residual effects of general anesthesia or sedation with regional / local anesthesia can last more than 24 hours.  Rest for the " remainder of the day or longer if your Doctor/Surgeon has advised you to do so.  Although you may feel normal within the first 24 hours, your reflexes and mental ability may be impaired without you realizing it.  You may feel dizzy, lightheaded or sleepy for 24 hours or longer.      Be sure to go to all follow-up visits with your doctor. And rest after your surgery for as long as your doctor tells you to.  Coping with pain  If you have pain after surgery, pain medicine will help you feel better. Take it as told, before pain becomes severe. Also, ask your doctor or pharmacist about other ways to control pain. This might be with heat, ice, or relaxation. And follow any other instructions your surgeon or nurse gives you.  Tips for taking pain medicine  To get the best relief possible, remember these points:  · Pain medicines can upset your stomach. Taking them with a little food may help.  · Most pain relievers taken by mouth need at least 20 to 30 minutes to start to work.  · Taking medicine on a schedule can help you remember to take it. Try to time your medicine so that you can take it before starting an activity. This might be before you get dressed, go for a walk, or sit down for dinner.  · Constipation is a common side effect of pain medicines. Call your doctor before taking any medicines such as laxatives or stool softeners to help ease constipation. Also ask if you should skip any foods. Drinking lots of fluids and eating foods such as fruits and vegetables that are high in fiber can also help. Remember, do not take laxatives unless your surgeon has prescribed them.  · Drinking alcohol and taking pain medicine can cause dizziness and slow your breathing. It can even be deadly. Do not drink alcohol while taking pain medicine.  · Pain medicine can make you react more slowly to things. Do not drive or run machinery while taking pain medicine.  Your health care provider may tell you to take acetaminophen to help  ease your pain. Ask him or her how much you are supposed to take each day. Acetaminophen or other pain relievers may interact with your prescription medicines or other over-the-counter (OTC) drugs. Some prescription medicines have acetaminophen and other ingredients. Using both prescription and OTC acetaminophen for pain can cause you to overdose. Read the labels on your OTC medicines with care. This will help you to clearly know the list of ingredients, how much to take, and any warnings. It may also help you not take too much acetaminophen. If you have questions or do not understand the information, ask your pharmacist or health care provider to explain it to you before you take the OTC medicine.  Managing nausea  Some people have an upset stomach after surgery. This is often because of anesthesia, pain, or pain medicine, or the stress of surgery. These tips will help you handle nausea and eat healthy foods as you get better. If you were on a special food plan before surgery, ask your doctor if you should follow it while you get better. These tips may help:  · Do not push yourself to eat. Your body will tell you when to eat and how much.  · Start off with clear liquids and soup. They are easier to digest.  · Next try semi-solid foods, such as mashed potatoes, applesauce, and gelatin, as you feel ready.  · Slowly move to solid foods. Dont eat fatty, rich, or spicy foods at first.  · Do not force yourself to have 3 large meals a day. Instead eat smaller amounts more often.  · Take pain medicines with a small amount of solid food, such as crackers or toast, to avoid nausea.     Call your surgeon if  · You still have pain an hour after taking medicine. The medicine may not be strong enough.  · You feel too sleepy, dizzy, or groggy. The medicine may be too strong.  · You have side effects like nausea, vomiting, or skin changes, such as rash, itching, or hives.       If you have obstructive sleep apnea  You were given  anesthesia medicine during surgery to keep you comfortable and free of pain. After surgery, you may have more apnea spells because of this medicine and other medicines you were given. The spells may last longer than usual.   At home:  · Keep using the continuous positive airway pressure (CPAP) device when you sleep. Unless your health care provider tells you not to, use it when you sleep, day or night. CPAP is a common device used to treat obstructive sleep apnea.  · Talk with your provider before taking any pain medicine, muscle relaxants, or sedatives. Your provider will tell you about the possible dangers of taking these medicines.  © 8327-9405 [employer]. [address] [place] [address], [place] [address]. All rights reserved. This information is not intended as a substitute for professional medical care. Always follow your healthcare professional's instructions.

## 2019-03-21 VITALS
OXYGEN SATURATION: 96 % | TEMPERATURE: 98 F | HEART RATE: 74 BPM | BODY MASS INDEX: 24.34 KG/M2 | RESPIRATION RATE: 16 BRPM | HEIGHT: 70 IN | DIASTOLIC BLOOD PRESSURE: 73 MMHG | WEIGHT: 170 LBS | SYSTOLIC BLOOD PRESSURE: 120 MMHG

## 2022-01-26 ENCOUNTER — OFFICE VISIT (OUTPATIENT)
Dept: UROLOGY | Facility: CLINIC | Age: 72
End: 2022-01-26
Payer: MEDICARE

## 2022-01-26 VITALS
HEART RATE: 74 BPM | WEIGHT: 174.38 LBS | HEIGHT: 70 IN | BODY MASS INDEX: 24.97 KG/M2 | SYSTOLIC BLOOD PRESSURE: 131 MMHG | DIASTOLIC BLOOD PRESSURE: 83 MMHG

## 2022-01-26 DIAGNOSIS — R97.20 ELEVATED PSA: Primary | ICD-10-CM

## 2022-01-26 PROCEDURE — 3288F FALL RISK ASSESSMENT DOCD: CPT | Mod: CPTII,S$GLB,, | Performed by: UROLOGY

## 2022-01-26 PROCEDURE — 1126F PR PAIN SEVERITY QUANTIFIED, NO PAIN PRESENT: ICD-10-PCS | Mod: CPTII,S$GLB,, | Performed by: UROLOGY

## 2022-01-26 PROCEDURE — 1159F MED LIST DOCD IN RCRD: CPT | Mod: CPTII,S$GLB,, | Performed by: UROLOGY

## 2022-01-26 PROCEDURE — 99999 PR PBB SHADOW E&M-EST. PATIENT-LVL III: ICD-10-PCS | Mod: PBBFAC,,, | Performed by: UROLOGY

## 2022-01-26 PROCEDURE — 1101F PT FALLS ASSESS-DOCD LE1/YR: CPT | Mod: CPTII,S$GLB,, | Performed by: UROLOGY

## 2022-01-26 PROCEDURE — 3008F PR BODY MASS INDEX (BMI) DOCUMENTED: ICD-10-PCS | Mod: CPTII,S$GLB,, | Performed by: UROLOGY

## 2022-01-26 PROCEDURE — 3288F PR FALLS RISK ASSESSMENT DOCUMENTED: ICD-10-PCS | Mod: CPTII,S$GLB,, | Performed by: UROLOGY

## 2022-01-26 PROCEDURE — 3075F PR MOST RECENT SYSTOLIC BLOOD PRESS GE 130-139MM HG: ICD-10-PCS | Mod: CPTII,S$GLB,, | Performed by: UROLOGY

## 2022-01-26 PROCEDURE — 1101F PR PT FALLS ASSESS DOC 0-1 FALLS W/OUT INJ PAST YR: ICD-10-PCS | Mod: CPTII,S$GLB,, | Performed by: UROLOGY

## 2022-01-26 PROCEDURE — 3075F SYST BP GE 130 - 139MM HG: CPT | Mod: CPTII,S$GLB,, | Performed by: UROLOGY

## 2022-01-26 PROCEDURE — 1160F RVW MEDS BY RX/DR IN RCRD: CPT | Mod: CPTII,S$GLB,, | Performed by: UROLOGY

## 2022-01-26 PROCEDURE — 99203 OFFICE O/P NEW LOW 30 MIN: CPT | Mod: S$GLB,,, | Performed by: UROLOGY

## 2022-01-26 PROCEDURE — 3079F DIAST BP 80-89 MM HG: CPT | Mod: CPTII,S$GLB,, | Performed by: UROLOGY

## 2022-01-26 PROCEDURE — 99999 PR PBB SHADOW E&M-EST. PATIENT-LVL III: CPT | Mod: PBBFAC,,, | Performed by: UROLOGY

## 2022-01-26 PROCEDURE — 3008F BODY MASS INDEX DOCD: CPT | Mod: CPTII,S$GLB,, | Performed by: UROLOGY

## 2022-01-26 PROCEDURE — 1159F PR MEDICATION LIST DOCUMENTED IN MEDICAL RECORD: ICD-10-PCS | Mod: CPTII,S$GLB,, | Performed by: UROLOGY

## 2022-01-26 PROCEDURE — 1160F PR REVIEW ALL MEDS BY PRESCRIBER/CLIN PHARMACIST DOCUMENTED: ICD-10-PCS | Mod: CPTII,S$GLB,, | Performed by: UROLOGY

## 2022-01-26 PROCEDURE — 99203 PR OFFICE/OUTPT VISIT, NEW, LEVL III, 30-44 MIN: ICD-10-PCS | Mod: S$GLB,,, | Performed by: UROLOGY

## 2022-01-26 PROCEDURE — 1126F AMNT PAIN NOTED NONE PRSNT: CPT | Mod: CPTII,S$GLB,, | Performed by: UROLOGY

## 2022-01-26 PROCEDURE — 3079F PR MOST RECENT DIASTOLIC BLOOD PRESSURE 80-89 MM HG: ICD-10-PCS | Mod: CPTII,S$GLB,, | Performed by: UROLOGY

## 2022-01-26 NOTE — PROGRESS NOTES
"Urology - [facility]  Clinic Note    SUBJECTIVE:     Chief Complaint: elevated psa    History of Present Illness:  Josafat Rodrigues is a 72 y.o. male who presents to clinic for elevated psa. He is new to our clinic. Referral from Self, Aaareferral   No family history of prostate cancer.   PSA 8. Negative biopsy x 1. MRI lesion is anterior.  No smoking history.   Nocturia x 1-2 times. Doesn't limit fluids before bed.   Has caffeine after 3pm.   No LE edema.   Snores on occasion. Never had a sleep study.     Good stream. Doesn't strain. On flomax - which improved obstructive symptoms.   No daytime frequency. Doesn't drink a lot of water.     No new bone pain.   No weight loss.       Anticoagulation:  No    OBJECTIVE:     Estimated body mass index is 25.02 kg/m² as calculated from the following:    Height as of this encounter: 5' 10" (1.778 m).    Weight as of this encounter: 79.1 kg (174 lb 6.1 oz).    Vital Signs (Most Recent)  Pulse: 74 (01/26/22 1202)  BP: 131/83 (01/26/22 1202)    Physical Exam  Vitals reviewed.   Pulmonary:      Effort: Pulmonary effort is normal.   Genitourinary:     Comments: Prostate was smooth without nodularity. No rectal masses.  35 grams.       Imaging:  No imaging to review, reports of Pirads 5 lesion. 12/30/21  Lesion with imaging characteristics consistent with adenocarcinoma centered at 2:00 in the anterior aspect of the prostate centered in the mid gland but extending to apex and to the base (please see above description). This is actually located in the   central/transition zone and may not have been sampled on the previous standard random biopsy of the prostate. Consider targeted biopsy.       ASSESSMENT     1. Elevated PSA      PLAN:   Josafat was seen today for discuss fusion biopsy.    Diagnoses and all orders for this visit:    Elevated PSA  -     Transrectal Ultrasound w/ Biopsy; Future    will have to get outside films. Upload, have radiology ellyn and then schedule " uronav.       Nandini Sethi MD     Letter to Self, Aaareferral

## 2022-02-07 ENCOUNTER — TELEPHONE (OUTPATIENT)
Dept: UROLOGY | Facility: CLINIC | Age: 72
End: 2022-02-07
Payer: MEDICARE

## 2022-02-07 NOTE — TELEPHONE ENCOUNTER
Jennifer - did we get the MRI prostate to upload? He is schedule for a uronav on Wednesday. We can't do uronav without upload and link.

## 2022-02-16 ENCOUNTER — DOCUMENTATION ONLY (OUTPATIENT)
Dept: UROLOGY | Facility: CLINIC | Age: 72
End: 2022-02-16
Payer: MEDICARE

## 2022-02-16 NOTE — PROGRESS NOTES
0psa 8.06  asap in [place] apex specimens.   Has nodule 0.5cm  trus 62g    MRI central/TZ lesion 1.5cm pirads 5

## 2022-02-23 ENCOUNTER — PROCEDURE VISIT (OUTPATIENT)
Dept: UROLOGY | Facility: CLINIC | Age: 72
End: 2022-02-23
Payer: MEDICARE

## 2022-02-23 VITALS
RESPIRATION RATE: 16 BRPM | HEART RATE: 71 BPM | WEIGHT: 175.13 LBS | TEMPERATURE: 98 F | SYSTOLIC BLOOD PRESSURE: 131 MMHG | DIASTOLIC BLOOD PRESSURE: 76 MMHG | HEIGHT: 70 IN | BODY MASS INDEX: 25.07 KG/M2

## 2022-02-23 DIAGNOSIS — R97.20 ELEVATED PSA: Primary | ICD-10-CM

## 2022-02-23 PROCEDURE — 55700 TRANSRECTAL ULTRASOUND W/ BIOPSY: CPT | Mod: S$GLB,,, | Performed by: UROLOGY

## 2022-02-23 PROCEDURE — 76872 TRANSRECTAL ULTRASOUND W/ BIOPSY: ICD-10-PCS | Mod: 26,S$GLB,, | Performed by: UROLOGY

## 2022-02-23 PROCEDURE — 76872 US TRANSRECTAL: CPT | Mod: 26,S$GLB,, | Performed by: UROLOGY

## 2022-02-23 PROCEDURE — 88305 TISSUE EXAM BY PATHOLOGIST: CPT | Mod: 26,,, | Performed by: PATHOLOGY

## 2022-02-23 PROCEDURE — 88341 PR IHC OR ICC EACH ADD'L SINGLE ANTIBODY  STAINPR: ICD-10-PCS | Mod: 26,,, | Performed by: PATHOLOGY

## 2022-02-23 PROCEDURE — 88342 CHG IMMUNOCYTOCHEMISTRY: ICD-10-PCS | Mod: 26,,, | Performed by: PATHOLOGY

## 2022-02-23 PROCEDURE — 88342 IMHCHEM/IMCYTCHM 1ST ANTB: CPT | Performed by: PATHOLOGY

## 2022-02-23 PROCEDURE — 88305 TISSUE EXAM BY PATHOLOGIST: CPT | Performed by: PATHOLOGY

## 2022-02-23 PROCEDURE — 96372 PR INJECTION,THERAP/PROPH/DIAG2ST, IM OR SUBCUT: ICD-10-PCS | Mod: 59,S$GLB,, | Performed by: UROLOGY

## 2022-02-23 PROCEDURE — 88341 IMHCHEM/IMCYTCHM EA ADD ANTB: CPT | Mod: 26,,, | Performed by: PATHOLOGY

## 2022-02-23 PROCEDURE — 88305 TISSUE EXAM BY PATHOLOGIST: ICD-10-PCS | Mod: 26,,, | Performed by: PATHOLOGY

## 2022-02-23 PROCEDURE — 96372 THER/PROPH/DIAG INJ SC/IM: CPT | Mod: 59,S$GLB,, | Performed by: UROLOGY

## 2022-02-23 PROCEDURE — 88342 IMHCHEM/IMCYTCHM 1ST ANTB: CPT | Mod: 26,,, | Performed by: PATHOLOGY

## 2022-02-23 PROCEDURE — 55700 TRANSRECTAL ULTRASOUND W/ BIOPSY: ICD-10-PCS | Mod: S$GLB,,, | Performed by: UROLOGY

## 2022-02-23 PROCEDURE — 88341 IMHCHEM/IMCYTCHM EA ADD ANTB: CPT | Mod: 59 | Performed by: PATHOLOGY

## 2022-02-23 RX ORDER — LIDOCAINE HYDROCHLORIDE 20 MG/ML
JELLY TOPICAL
Status: COMPLETED | OUTPATIENT
Start: 2022-02-23 | End: 2022-02-23

## 2022-02-23 RX ORDER — LIDOCAINE HYDROCHLORIDE 10 MG/ML
10 INJECTION INFILTRATION; PERINEURAL
Status: COMPLETED | OUTPATIENT
Start: 2022-02-23 | End: 2022-02-23

## 2022-02-23 RX ORDER — CEFTRIAXONE 1 G/1
1 INJECTION, POWDER, FOR SOLUTION INTRAMUSCULAR; INTRAVENOUS
Status: COMPLETED | OUTPATIENT
Start: 2022-02-23 | End: 2022-02-23

## 2022-02-23 RX ADMIN — LIDOCAINE HYDROCHLORIDE: 20 JELLY TOPICAL at 10:02

## 2022-02-23 RX ADMIN — LIDOCAINE HYDROCHLORIDE 10 ML: 10 INJECTION INFILTRATION; PERINEURAL at 10:02

## 2022-02-23 RX ADMIN — CEFTRIAXONE 1 G: 1 INJECTION, POWDER, FOR SOLUTION INTRAMUSCULAR; INTRAVENOUS at 10:02

## 2022-02-23 NOTE — PATIENT INSTRUCTIONS
What to Expect After a Prostate Biopsy    Please be sure to finish your pre-procedure antibiotics as instructed.    You may have mild bleeding from the rectum or urine for about 1 week to 1 month, or in your ejaculate for several months. This bleeding is normal and expected, and it will stop. You may have mild discomfort in your rectal or urethral area for 24-48 hours.    You cannot do any strenuous lifting, straining, or exercising for 24 hours. You may return to full activity the day after the biopsy.    You may continue to take all your regular medications after the procedure except for the blood thinners.    You may resume all blood-thinning medications once you no longer see any bleeding or whenever your physician prescribing the medication says it is all right to do so. You may take Tylenol if you have a fever and your temperature is less than 100° F or if you have some discomfort.    You will receive a call from the Urology Department at [facility] with the results of your prostate biopsy within one week.    Signs and Symptoms to Report    Call your [facility] urologist at [phone] if you develop any of the following:  Temperature greater than 101°  F  Inability to urinate  A large amount of bleeding from the rectum or in the urine  Persistent or severe pain    After hours or on weekends, you may reach a urology resident on call at this number: [phone].

## 2022-02-23 NOTE — PROCEDURES
"Transrectal Ultrasound w/ Biopsy    Date/Time: 2/23/2022 10:30 AM  Performed by: Nandini Sethi MD  Authorized by: Nandini Sethi MD     Consent Done?:  Yes (Written)  Time out: Immediately prior to procedure a "time out" was called to verify the correct patient, procedure, equipment, support staff and site/side marked as required.    Indications: Elevated PSA    Preparation: Patient was prepped and draped in usual sterile fashion    Position:  Left lateral  Anesthesia:  10cc's 1% Lidocaine  Patient sedated: Yes    Prostate Size:  35g  Lesions:: Yes         Type:  Hypoechoic  Left Base Biopsies: 2  Left Mid Biopsies: 2  Left [place] Biopsies: 2  Right Base Biopsies: 2  Right Mid Biopsies: 2  Right [place] Biopsies: 2  Total Biopsies:  15    Patient tolerance:  Patient tolerated the procedure well with no immediate complications     Will call with results      "

## 2022-03-03 LAB
COMMENT: NORMAL
FINAL PATHOLOGIC DIAGNOSIS: NORMAL
GROSS: NORMAL
Lab: NORMAL

## 2022-03-14 ENCOUNTER — TELEPHONE (OUTPATIENT)
Dept: UROLOGY | Facility: CLINIC | Age: 72
End: 2022-03-14
Payer: MEDICARE

## 2022-03-14 NOTE — TELEPHONE ENCOUNTER
Please fax a copy of the pathology report and my procedure and clinic note to patient. Also send a copy of the letter I wrote.     Attn. Dr. Sagrario Boss  Fax [phone]

## 2022-03-14 NOTE — LETTER
March 14, 2022    Josafat Rodrigues  [address] [place], [place] [address] [place] [address]             [facility]  [address]  [facility] [address]  Phone: [phone] Dr. Sagrario Boss,     I have informed Josafat Ravi of the biopsy findings. The target lesion was benign. The left mid did show possible ASAP, but the pathologist felt it was likely benign. I had recommended to him to just follow up in 6 months with a PSA with you since he already had a biopsy 2 months ago. Thanks for sending him to me.     If you have any questions or concerns, please don't hesitate to call.    Sincerely,        Nandini Sethi MD

## 2022-03-15 ENCOUNTER — TELEPHONE (OUTPATIENT)
Dept: UROLOGY | Facility: CLINIC | Age: 72
End: 2022-03-15
Payer: MEDICARE

## 2022-03-15 NOTE — TELEPHONE ENCOUNTER
Progress Notes, Procedure Notes and Pathlogy results were faxed to Dr. Maryanne Boss.    [phone] is the fax number.    JULIANO Rodriguez

## 2023-02-18 ENCOUNTER — HOSPITAL ENCOUNTER (EMERGENCY)
Facility: HOSPITAL | Age: 73
Discharge: HOME | End: 2023-02-18
Attending: EMERGENCY MEDICINE
Payer: MEDICARE

## 2023-02-18 VITALS
TEMPERATURE: 96 F | SYSTOLIC BLOOD PRESSURE: 135 MMHG | OXYGEN SATURATION: 100 % | BODY MASS INDEX: 24.34 KG/M2 | WEIGHT: 170 LBS | DIASTOLIC BLOOD PRESSURE: 77 MMHG | HEART RATE: 95 BPM | HEIGHT: 70 IN | RESPIRATION RATE: 18 BRPM

## 2023-02-18 DIAGNOSIS — R21 RASH: Primary | ICD-10-CM

## 2023-02-18 PROCEDURE — 99283 EMERGENCY DEPT VISIT LOW MDM: CPT

## 2023-02-18 RX ORDER — TAMSULOSIN HYDROCHLORIDE 0.4 MG/1
CAPSULE ORAL
COMMUNITY
Start: 2023-01-31

## 2023-02-18 RX ORDER — LORATADINE 10 MG/1
10 TABLET ORAL DAILY
Qty: 14 TABLET | Refills: 0 | Status: SHIPPED | OUTPATIENT
Start: 2023-02-18 | End: 2023-03-04

## 2023-02-18 RX ORDER — HYDROXYZINE HYDROCHLORIDE 25 MG/1
25 TABLET, FILM COATED ORAL 4 TIMES DAILY PRN
Qty: 15 TABLET | Refills: 0 | Status: SHIPPED | OUTPATIENT
Start: 2023-02-18

## 2023-02-18 NOTE — ED PROVIDER NOTES
Chief complaint:  Rash (Comes and goes, burning and itching. Denies any changes in meds, soaps or laundry detergent. )      HPI:  Josafat Rodrigues is a 73 y.o. male testicular CA s/p orchiectomy and radiation 2019 presenting with months of intermittent rash primarily to the trunk but also involving the extremities.  Bumpy, itchy, slightly red lesions a current patches and are intensely pruritic, resolving in a matter of hours most often.  They involve various parts of the body that are not always the same.  Patient denies new exposures.  No family members with similar rash.  He denies systemic symptoms such as fever, chills, unexpected weight gain or weight loss, loss of appetite.  He denies new medication.  He has no known allergies or new exposures.  He denies travel history.    ROS: As per HPI and below:  No headache, chest pain, dyspnea, abdominal pain, vomiting, diarrhea, fever, swelling, oliguria, visual change.    Review of patient's allergies indicates:  No Known Allergies    Patient's Medications   New Prescriptions    HYDROXYZINE HCL (ATARAX) 25 MG TABLET    Take 1 tablet (25 mg total) by mouth 4 (four) times daily as needed for Itching.    LORATADINE (CLARITIN) 10 MG TABLET    Take 1 tablet (10 mg total) by mouth once daily. for 14 days   Previous Medications    TAMSULOSIN (FLOMAX) 0.4 MG CAP    Take by mouth.   Modified Medications    No medications on file   Discontinued Medications    No medications on file       PMH:  As per HPI and below:  Past Medical History:   Diagnosis Date    Cancer     testicular tumor- had radiation    Elevated PSA     GERD (gastroesophageal reflux disease)      Past Surgical History:   Procedure Laterality Date    COLONOSCOPY  02/02/2004    Dr. Blanchard, in legacy: rectal colon polyp removed, erythema to rectum; biopsy report in legacy    COLONOSCOPY N/A 2/5/2019    Procedure: COLONOSCOPY;  Surgeon: Rita Hutchins MD;  Location: [facility];  Service: Endoscopy;   Laterality: N/A;    FLEXIBLE SIGMOIDOSCOPY N/A 3/20/2019    Procedure: SIGMOIDOSCOPY, FLEXIBLE;  Surgeon: Rita Hutchins MD;  Location: [facility];  Service: Endoscopy;  Laterality: N/A;    HERNIA REPAIR      ORCHIECTOMY      TONSILLECTOMY         Social History     Socioeconomic History    Marital status:    Tobacco Use    Smoking status: Never    Smokeless tobacco: Never   Substance and Sexual Activity    Alcohol use: No    Drug use: No   Social History Narrative    .  Three kids.  Occup: electrical business.         Family History   Problem Relation Age of Onset    Arthritis Mother     Hearing loss Mother     Vision loss Mother     Cancer Father     Colon cancer Neg Hx     Ulcerative colitis Neg Hx     Stomach cancer Neg Hx     Esophageal cancer Neg Hx     Crohn's disease Neg Hx        Physical Exam:    Vitals:    02/18/23 1146   BP: (!) 158/103   Pulse: 84   Resp: 20     GENERAL:  No apparent distress.  Alert.    HEENT:  Moist mucous membranes.  Normocephalic and atraumatic.    NECK:  No swelling.  Midline trachea.  Normal phonation.  CARDIOVASCULAR:  Regular rate and rhythm.  2+ radial pulses.    PULMONARY:  Lungs clear to auscultation bilaterally.  No wheezes, rales, or rhonci.    ABDOMEN:  Non-tender and non-distended.    EXTREMITIES:  Warm and well perfused.  Brisk capillary refill.  No edema.  NEUROLOGICAL:  Normal mental status.  Appropriate and conversant.    SKIN:  Macular rash to the right lateral posterior thigh.  Lesions christa to direct pressure.  They are somewhat confluent in places.  There are no petechiae or purpura within this vicinity.  There are no intraoral lesions.  Negative Nikolsky sign.  BACK:  Atraumatic.  No CVA tenderness to palpation.      Labs Reviewed - No data to display    Current Discharge Medication List        START taking these medications    Details   hydrOXYzine HCL (ATARAX) 25 MG tablet Take 1 tablet (25 mg total) by mouth 4 (four) times daily as needed for  Itching.  Qty: 15 tablet, Refills: 0      loratadine (CLARITIN) 10 mg tablet Take 1 tablet (10 mg total) by mouth once daily. for 14 days  Qty: 14 tablet, Refills: 0           CONTINUE these medications which have NOT CHANGED    Details   tamsulosin (FLOMAX) 0.4 mg Cap Take by mouth.             Orders Placed This Encounter   Procedures    Vital signs       Imaging Results    None              MDM:    73 y.o. male with intermittent, pruritic, macular rash most consistent with chronic urticaria.  He has no other systemic symptoms and utility of additional ED laboratories would be very low.  I will refer him to Dermatology.  Trial of daily antihistamine with cetirizine recommended with hydroxyzine prescribed as necessary for itching as well.  I have very low suspicion for acute, life-threatening issue I do not think further alternative the ED emergent treatment is indicated.  I doubt SJS, TEN, DRESS, or other emergent cause. I will reserve decision for chronic steroids to dermatology pending further assessment and workup.  Broad differential for potential diagnosis an inciting etiology discussed in detail with patient and significant other present.  Detailed return precautions reviewed.    Diagnoses:    1. Rash       Panda Kirk MD  02/18/23 3739

## 2023-02-18 NOTE — DISCHARGE INSTRUCTIONS
Instructions have been given about chronic hives, but exact diagnosis is uncertain and should be further explored with Dermatology.

## 2023-07-05 ENCOUNTER — TELEPHONE (OUTPATIENT)
Dept: GASTROENTEROLOGY | Facility: CLINIC | Age: 73
End: 2023-07-05
Payer: MEDICARE

## 2023-07-05 NOTE — TELEPHONE ENCOUNTER
----- Message from Ivana Wagner MA sent at 7/5/2023 12:33 PM CDT -----  Contact: pt wife  Type:  Same Day Appointment Request    Caller is requesting a same day appointment.  Caller declined first available appointment listed below.      Name of Caller:  pt wife  When is the first available appointment?  N/a  Symptoms:  n/a  Best Call Back Number:  [phone]    Additional Information:   please advise pt wants an appt first ting in the AM

## 2023-07-05 NOTE — TELEPHONE ENCOUNTER
Returned call to Mr. Maurice, he stated he was in the middle of something an d needed to call us back. No further issues noted.

## 2023-07-10 ENCOUNTER — TELEPHONE (OUTPATIENT)
Dept: GASTROENTEROLOGY | Facility: CLINIC | Age: 73
End: 2023-07-10
Payer: MEDICARE

## 2023-07-10 DIAGNOSIS — Z86.010 HISTORY OF COLON POLYPS: Primary | ICD-10-CM

## 2023-07-10 NOTE — TELEPHONE ENCOUNTER
Call placed to Pavel Ravi, colonoscopy scheduled for 9/21 at 10a arriving for 9a. He accepted. Prep instructions reviewed and placed in mail. He verbalized understanding. No further issues noted.

## 2023-07-10 NOTE — TELEPHONE ENCOUNTER
----- Message from Javier Chin sent at 7/10/2023  9:31 AM CDT -----  Contact: pt at [phone]  Type:  Patient Returning Call    Who Called:  pt  Who Left Message for Patient:  Azul  Does the patient know what this is regarding?:  yes  Best Call Back Number:  [phone]  Additional Information:  pt is calling the office back to return a call back to Azul.

## 2023-09-21 ENCOUNTER — ANESTHESIA (OUTPATIENT)
Dept: ENDOSCOPY | Facility: HOSPITAL | Age: 73
End: 2023-09-21
Payer: MEDICARE

## 2023-09-21 ENCOUNTER — HOSPITAL ENCOUNTER (OUTPATIENT)
Facility: HOSPITAL | Age: 73
Discharge: HOME | End: 2023-09-21
Attending: INTERNAL MEDICINE | Admitting: NURSE PRACTITIONER
Payer: MEDICARE

## 2023-09-21 ENCOUNTER — ANESTHESIA EVENT (OUTPATIENT)
Dept: ENDOSCOPY | Facility: HOSPITAL | Age: 73
End: 2023-09-21
Payer: MEDICARE

## 2023-09-21 DIAGNOSIS — Z86.010 HISTORY OF COLON POLYPS: ICD-10-CM

## 2023-09-21 PROCEDURE — D9220A PRA ANESTHESIA: Mod: PT,CRNA,, | Performed by: NURSE ANESTHETIST, CERTIFIED REGISTERED

## 2023-09-21 PROCEDURE — 45385 PR COLONOSCOPY,REMV LESN,SNARE: ICD-10-PCS | Mod: PT,,, | Performed by: INTERNAL MEDICINE

## 2023-09-21 PROCEDURE — 45385 COLONOSCOPY W/LESION REMOVAL: CPT | Mod: PT | Performed by: INTERNAL MEDICINE

## 2023-09-21 PROCEDURE — 37000008 HC ANESTHESIA 1ST 15 MINUTES: Performed by: INTERNAL MEDICINE

## 2023-09-21 PROCEDURE — 25000003 PHARM REV CODE 250: Performed by: INTERNAL MEDICINE

## 2023-09-21 PROCEDURE — 45385 COLONOSCOPY W/LESION REMOVAL: CPT | Mod: PT,,, | Performed by: INTERNAL MEDICINE

## 2023-09-21 PROCEDURE — 27201089 HC SNARE, DISP (ANY): Performed by: INTERNAL MEDICINE

## 2023-09-21 PROCEDURE — D9220A PRA ANESTHESIA: Mod: PT,ANES,, | Performed by: ANESTHESIOLOGY

## 2023-09-21 PROCEDURE — 45380 PR COLONOSCOPY,BIOPSY: ICD-10-PCS | Mod: 59,PT,, | Performed by: INTERNAL MEDICINE

## 2023-09-21 PROCEDURE — D9220A PRA ANESTHESIA: ICD-10-PCS | Mod: PT,ANES,, | Performed by: ANESTHESIOLOGY

## 2023-09-21 PROCEDURE — D9220A PRA ANESTHESIA: ICD-10-PCS | Mod: PT,CRNA,, | Performed by: NURSE ANESTHETIST, CERTIFIED REGISTERED

## 2023-09-21 PROCEDURE — 25000003 PHARM REV CODE 250: Performed by: NURSE ANESTHETIST, CERTIFIED REGISTERED

## 2023-09-21 PROCEDURE — 37000009 HC ANESTHESIA EA ADD 15 MINS: Performed by: INTERNAL MEDICINE

## 2023-09-21 PROCEDURE — 63600175 PHARM REV CODE 636 W HCPCS: Performed by: NURSE ANESTHETIST, CERTIFIED REGISTERED

## 2023-09-21 PROCEDURE — 45380 COLONOSCOPY AND BIOPSY: CPT | Mod: 59,PT,, | Performed by: INTERNAL MEDICINE

## 2023-09-21 RX ORDER — SODIUM CHLORIDE 9 MG/ML
INJECTION, SOLUTION INTRAVENOUS CONTINUOUS
Status: DISCONTINUED | OUTPATIENT
Start: 2023-09-21 | End: 2023-09-21 | Stop reason: HOSPADM

## 2023-09-21 RX ORDER — LIDOCAINE HYDROCHLORIDE 20 MG/ML
INJECTION INTRAVENOUS
Status: DISCONTINUED | OUTPATIENT
Start: 2023-09-21 | End: 2023-09-21

## 2023-09-21 RX ORDER — PROPOFOL 10 MG/ML
VIAL (ML) INTRAVENOUS
Status: DISCONTINUED | OUTPATIENT
Start: 2023-09-21 | End: 2023-09-21

## 2023-09-21 RX ADMIN — PROPOFOL 50 MG: 10 INJECTION, EMULSION INTRAVENOUS at 11:09

## 2023-09-21 RX ADMIN — PROPOFOL 50 MG: 10 INJECTION, EMULSION INTRAVENOUS at 10:09

## 2023-09-21 RX ADMIN — PROPOFOL 100 MG: 10 INJECTION, EMULSION INTRAVENOUS at 10:09

## 2023-09-21 RX ADMIN — LIDOCAINE HYDROCHLORIDE 50 MG: 20 INJECTION, SOLUTION INTRAVENOUS at 10:09

## 2023-09-21 RX ADMIN — SODIUM CHLORIDE: 9 INJECTION, SOLUTION INTRAVENOUS at 09:09

## 2023-09-21 NOTE — H&P
"[facility] Note    CC: History of colon polyps    HPI 73 y.o. male presents for surveillance colonoscopy due to history of colon polyps. 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    Elevated PSA     GERD (gastroesophageal reflux disease)        Allergies and Medications reviewed     Review of Systems  General ROS: negative for - chills, fever or weight loss  Cardiovascular ROS: no chest pain or dyspnea on exertion  Gastrointestinal ROS: no vomiting    Physical Examination  BP (!) 156/80 (BP Location: Left arm, Patient Position: Lying)   Pulse 62   Temp 97.9 °F (36.6 °C) (Skin)   Resp 20   Ht 5' 10" (1.778 m)   Wt 77.1 kg (170 lb)   SpO2 95%   BMI 24.39 kg/m²   General appearance: alert, cooperative, no distress  HENT: Normocephalic, atraumatic, neck symmetrical, no nasal discharge, sclera anicteric   Lungs: clear to auscultation bilaterally, symmetric chest wall expansion bilaterally  Heart: regular rate and rhythm without rub; no displacement of the PMI   Abdomen: soft  Extremities: extremities symmetric; no clubbing, cyanosis, or edema        Assessment:   73 y.o. male presents for colonoscopy    Plan:  -Proceed to colonoscopy     Rita Hutchins MD  [facility]  [address], [address]  [place] [address]  Office: [phone]  Fax: [phone]   "

## 2023-09-21 NOTE — OR NURSING
1155 Md in to speak with pt  1157 Pt tolerating po fluids, pain controlled. All belongings returned to pt. D/c instructions given and explained to pt, pt v/u. D/c'd out to pov via wc per staff with joni.

## 2023-09-21 NOTE — ANESTHESIA PREPROCEDURE EVALUATION
09/21/2023  Josafat Rodrigues is a 73 y.o., male.      Pre-op Assessment    I have reviewed the Patient Summary Reports.     I have reviewed the Nursing Notes. I have reviewed the NPO Status.   I have reviewed the Medications.     Review of Systems  Anesthesia Hx:  No problems with previous Anesthesia    Social:  Non-Smoker    Hematology/Oncology:         -- Cancer in past history:   Cardiovascular:  Cardiovascular Normal     Pulmonary:  Pulmonary Normal    Renal/:  Renal/ Normal     Hepatic/GI:   GERD, well controlled    Neurological:  Neurology Normal    Endocrine:  Endocrine Normal        Physical Exam  General: Well nourished, Cooperative, Alert and Oriented    Airway:  Mallampati: II   Mouth Opening: Normal  TM Distance: Normal  Neck ROM: Normal ROM        Anesthesia Plan  Type of Anesthesia, risks & benefits discussed:    Anesthesia Type: Gen ETT, Gen Supraglottic Airway, Gen Natural Airway, MAC  Intra-op Monitoring Plan: Standard ASA Monitors  Post Op Pain Control Plan: multimodal analgesia  Induction:  IV  Airway Plan: Direct, Video and Fiberoptic, Post-Induction  Informed Consent: Informed consent signed with the Patient and all parties understand the risks and agree with anesthesia plan.  All questions answered.   ASA Score: 2    Ready For Surgery From Anesthesia Perspective.     .

## 2023-09-21 NOTE — ANESTHESIA POSTPROCEDURE EVALUATION
Anesthesia Post Evaluation    Patient: Josafat Rodrigues    Procedure(s) Performed: Procedure(s) (LRB):  COLONOSCOPY (N/A)    Final Anesthesia Type: general      Patient location during evaluation: PACU  Patient participation: Yes- Able to Participate  Level of consciousness: awake and alert and oriented  Post-procedure vital signs: reviewed and stable  Pain management: adequate  Airway patency: patent    PONV status at discharge: No PONV  Anesthetic complications: no      Cardiovascular status: blood pressure returned to baseline and stable  Respiratory status: unassisted and spontaneous ventilation  Hydration status: euvolemic  Follow-up not needed.          Vitals Value Taken Time   /84 09/21/23 1140   Temp 36.7 °C (98 °F) 09/21/23 1140   Pulse 66 09/21/23 1140   Resp 18 09/21/23 1140   SpO2 97 % 09/21/23 1140         Event Time   Out of Recovery 11:57:00         Pain/Jackson Score: Jackson Score: 10 (9/21/2023 11:40 AM)

## 2023-09-21 NOTE — TRANSFER OF CARE
"Anesthesia Transfer of Care Note    Patient: Josafat Rodrigues    Procedure(s) Performed: Procedure(s) (LRB):  COLONOSCOPY (N/A)    Patient location: GI    Anesthesia Type: general    Transport from OR: Transported from OR on room air with adequate spontaneous ventilation    Post pain: adequate analgesia    Post assessment: no apparent anesthetic complications    Post vital signs: stable    Level of consciousness: awake    Nausea/Vomiting: no nausea/vomiting    Complications: none    Transfer of care protocol was followed      Last vitals:   Visit Vitals  BP (!) 156/80 (BP Location: Left arm, Patient Position: Lying)   Pulse 62   Temp 36.6 °C (97.9 °F) (Skin)   Resp 20   Ht 5' 10" (1.778 m)   Wt 77.1 kg (170 lb)   SpO2 95%   BMI 24.39 kg/m²     "

## 2023-09-21 NOTE — [employer] PATIENT INSTRUCTIONS
Discharge Summary/Instructions after an Endoscopic Procedure  Patient Name: Josafat Rodrigues  Patient MRN: [id number]  Patient YOB: 1950  Thursday, September 21, 2023  Rita Hutchins MD  Dear patient,  As a result of recent federal legislation (The Federal Cures Act), you may   receive lab or pathology results from your procedure in your [facility]   account before your physician is able to contact you. Your physician or   their representative will relay the results to you with their   recommendations at their soonest availability.  Thank you,  RESTRICTIONS:  During your procedure today, you received medications for sedation.  These   medications may affect your judgment, balance and coordination.  Therefore,   for 24 hours, you have the following restrictions:   - DO NOT drive a car, operate machinery, make legal/financial decisions,   sign important papers or drink alcohol.    ACTIVITY:  Today: no heavy lifting, straining or running due to procedural   sedation/anesthesia.  The following day: return to full activity including work.  DIET:  Eat and drink normally unless instructed otherwise.     TREATMENT FOR COMMON SIDE EFFECTS:  - Mild abdominal pain, nausea, belching, bloating or excessive gas:  rest,   eat lightly and use a heating pad.  - Sore Throat: treat with throat lozenges and/or gargle with warm salt   water.  - Because air was used during the procedure, expelling large amounts of air   from your rectum or belching is normal.  - If a bowel prep was taken, you may not have a bowel movement for 1-3 days.    This is normal.  SYMPTOMS TO WATCH FOR AND REPORT TO YOUR PHYSICIAN:  1. Abdominal pain or bloating, other than gas cramps.  2. Chest pain.  3. Back pain.  4. Signs of infection such as: chills or fever occurring within 24 hours   after the procedure.  5. Rectal bleeding, which would show as bright red, maroon, or black stools.   (A tablespoon of blood from the rectum is not serious,  especially if   hemorrhoids are present.)  6. Vomiting.  7. Weakness or dizziness.  GO DIRECTLY TO THE NEAREST EMERGENCY ROOM IF YOU HAVE ANY OF THE FOLLOWING:      Difficulty breathing              Chills and/or fever over 101 F   Persistent vomiting and/or vomiting blood   Severe abdominal pain   Severe chest pain   Black, tarry stools   Bleeding- more than one tablespoon   Any other symptom or condition that you feel may need urgent attention  Your doctor recommends these additional instructions:  If any biopsies were taken, your doctors clinic will contact you in 1 to 2   weeks with any results.  - Discharge patient to home (with spouse).   - Patient has a contact number available for emergencies.  The signs and   symptoms of potential delayed complications were discussed with the   patient.  Return to normal activities tomorrow.  Written discharge   instructions were provided to the patient.   - Resume previous diet.   - Continue present medications.   - Await pathology results.   - Repeat colonoscopy in 3 years for surveillance based on pathology results.     - Return to my office PRN.  For questions, problems or results please call your physician - Rita Hutchins MD at Work:  [phone].  [facility], EMERGENCY ROOM PHONE NUMBER: [phone]  IF A COMPLICATION OR EMERGENCY SITUATION ARISES AND YOU ARE UNABLE TO REACH   YOUR PHYSICIAN - GO DIRECTLY TO THE EMERGENCY ROOM.  Rita Hutchins MD  9/21/2023 11:23:46 AM  This report has been verified and signed electronically.  Dear patient,  As a result of recent federal legislation (The Federal Cures Act), you may   receive lab or pathology results from your procedure in your [facility]   account before your physician is able to contact you. Your physician or   their representative will relay the results to you with their   recommendations at their soonest availability.  Thank you,  [employer]ATION

## 2023-09-22 VITALS
BODY MASS INDEX: 24.34 KG/M2 | RESPIRATION RATE: 18 BRPM | TEMPERATURE: 98 F | HEART RATE: 66 BPM | SYSTOLIC BLOOD PRESSURE: 135 MMHG | HEIGHT: 70 IN | DIASTOLIC BLOOD PRESSURE: 84 MMHG | WEIGHT: 170 LBS | OXYGEN SATURATION: 97 %

## 2023-09-22 NOTE — PROGRESS NOTES
Very pleased with care given.  States all staff were excellent. States he has read and understands all discharge instructions.

## 2023-12-29 ENCOUNTER — TELEPHONE (OUTPATIENT)
Dept: FAMILY MEDICINE | Facility: CLINIC | Age: 73
End: 2023-12-29
Payer: MEDICARE

## 2023-12-29 ENCOUNTER — TELEPHONE (OUTPATIENT)
Dept: PODIATRY | Facility: CLINIC | Age: 73
End: 2023-12-29
Payer: MEDICARE

## 2023-12-29 NOTE — TELEPHONE ENCOUNTER
----- Message from Nimisha Reddy sent at 12/29/2023  1:32 PM CST -----  Contact: self  Type: Sooner Appointment Request        Caller is requesting a sooner appointment. Caller declined first available appointment listed below. Caller will not accept being placed on the waitlist and is requesting a message be sent to doctor.        Name of Caller: patient   Best Call Back Number: [phone]  Additional Information: Pt having nerve problem in foot hurting really bad. (Left foot). Pt will see any dept. Thanks

## 2023-12-29 NOTE — TELEPHONE ENCOUNTER
----- Message from Nimisha Reddy sent at 12/29/2023  1:32 PM CST -----  Contact: self  Type: Sooner Appointment Request        Caller is requesting a sooner appointment. Caller declined first available appointment listed below. Caller will not accept being placed on the waitlist and is requesting a message be sent to doctor.        Name of Caller: patient   Best Call Back Number: [phone]  Additional Information: Pt having nerve problem in foot hurting really bad. (Left foot). Pt will see any dept. Thanks

## 2024-01-02 ENCOUNTER — TELEPHONE (OUTPATIENT)
Dept: PODIATRY | Facility: CLINIC | Age: 74
End: 2024-01-02
Payer: MEDICARE

## 2025-03-03 ENCOUNTER — OFFICE VISIT (OUTPATIENT)
Dept: URGENT CARE | Facility: CLINIC | Age: 75
End: 2025-03-03
Payer: MEDICARE

## 2025-03-03 VITALS
TEMPERATURE: 99 F | DIASTOLIC BLOOD PRESSURE: 76 MMHG | RESPIRATION RATE: 18 BRPM | BODY MASS INDEX: 24.34 KG/M2 | WEIGHT: 170 LBS | HEART RATE: 104 BPM | HEIGHT: 70 IN | SYSTOLIC BLOOD PRESSURE: 136 MMHG | OXYGEN SATURATION: 97 %

## 2025-03-03 DIAGNOSIS — U07.1 COVID: Primary | ICD-10-CM

## 2025-03-03 DIAGNOSIS — R09.81 SINUS CONGESTION: ICD-10-CM

## 2025-03-03 DIAGNOSIS — R05.1 ACUTE COUGH: ICD-10-CM

## 2025-03-03 DIAGNOSIS — U07.1 COVID-19 VIRUS DETECTED: ICD-10-CM

## 2025-03-03 LAB
CTP QC/QA: YES
CTP QC/QA: YES
FLUAV AG NPH QL: NEGATIVE
FLUBV AG NPH QL: NEGATIVE
SARS CORONAVIRUS 2 ANTIGEN: POSITIVE

## 2025-03-03 RX ORDER — NIRMATRELVIR AND RITONAVIR 300-100 MG
KIT ORAL
Qty: 30 TABLET | Refills: 0 | Status: SHIPPED | OUTPATIENT
Start: 2025-03-03

## 2025-03-03 RX ORDER — GUAIFENESIN AND DEXTROMETHORPHAN HYDROBROMIDE 10; 100 MG/5ML; MG/5ML
5 SYRUP ORAL EVERY 6 HOURS PRN
Qty: 236 ML | Refills: 0 | Status: SHIPPED | OUTPATIENT
Start: 2025-03-03 | End: 2025-03-13

## 2025-03-03 RX ORDER — CYCLOBENZAPRINE HCL 5 MG
TABLET ORAL
COMMUNITY
Start: 2025-02-24

## 2025-03-03 RX ORDER — HYDROCODONE BITARTRATE AND ACETAMINOPHEN 5; 325 MG/1; MG/1
1 TABLET ORAL EVERY 6 HOURS PRN
COMMUNITY
Start: 2024-09-05

## 2025-03-03 NOTE — PROGRESS NOTES
"Subjective:      Patient ID: Josafat Rodrigues is a 75 y.o. male.    Vitals:  height is 5' 10" (1.778 m) and weight is 77.1 kg (170 lb). His oral temperature is 98.6 °F (37 °C). His blood pressure is 136/76 and his pulse is 104. His respiration is 18 and oxygen saturation is 97%.     Chief Complaint: Sinus Problem    75-year-old afebrile male presents today with chief complaint of sinus pressure, nasal congestion, intermittent bilateral frontal headaches, and cough for the past several days.  He denies any fever, shortness of breath, difficulty breathing, or chest pain.  He appears well hydrated, nontoxic, very comfortable on room air.    Sinus Problem  This is a new problem. The current episode started yesterday. The problem has been gradually worsening since onset. There has been no fever. His pain is at a severity of 0/10. Associated symptoms include congestion, coughing, headaches and sinus pressure. Past treatments include acetaminophen. The treatment provided mild relief.       HENT:  Positive for congestion and sinus pressure.    Respiratory:  Positive for cough.    Skin:  Negative for erythema.   Neurological:  Positive for headaches.      Objective:     Physical Exam   Constitutional: He is oriented to person, place, and time.  Non-toxic appearance. He does not appear ill. No distress. normal  HENT:   Head: Normocephalic and atraumatic.   Ears:   Right Ear: Tympanic membrane, external ear and ear canal normal.   Left Ear: Tympanic membrane, external ear and ear canal normal.   Nose: Congestion present.   Mouth/Throat: Mucous membranes are moist. Oropharynx is clear.   Eyes: Conjunctivae are normal. Pupils are equal, round, and reactive to light. Extraocular movement intact   Neck: Neck supple. No neck rigidity present.   Cardiovascular: Normal rate, regular rhythm, normal heart sounds and normal pulses.   Pulmonary/Chest: Effort normal and breath sounds normal.   Abdominal: Normal appearance. "   Musculoskeletal: Normal range of motion.         General: Normal range of motion.      Cervical back: He exhibits no tenderness.   Lymphadenopathy:     He has no cervical adenopathy.   Neurological: He is alert and oriented to person, place, and time. He displays no weakness. Gait normal.   Skin: Skin is warm, dry, not diaphoretic, not pale and no rash. No bruising and No erythema no jaundice  Psychiatric: His behavior is normal.   Vitals reviewed.    Results for orders placed or performed in visit on 03/03/25   SARS Coronavirus 2 Antigen, POCT Manual Read    Collection Time: 03/03/25  9:14 AM   Result Value Ref Range    SARS Coronavirus 2 Antigen Positive (A) Negative, Presumptive Negative     Acceptable Yes    POCT Influenza A/B Rapid Antigen    Collection Time: 03/03/25  9:15 AM   Result Value Ref Range    Rapid Influenza A Ag Negative (A) Negative    Rapid Influenza B Ag Negative Negative     Acceptable Yes     No results found.     Assessment:     1. COVID    2. Sinus congestion    3. Acute cough        Plan:       COVID  -     nirmatrelvir-ritonavir (PAXLOVID) 300 mg (150 mg x 2)-100 mg copackaged tablets (EUA); Take 3 tablets by mouth 2 (two) times daily. Each dose contains 2 nirmatrelvir (pink tablets) and 1 ritonavir (white tablet). Take all 3 tablets together  Dispense: 30 tablet; Refill: 0    Sinus congestion  -     SARS Coronavirus 2 Antigen, POCT Manual Read  -     POCT Influenza A/B Rapid Antigen    Acute cough  -     dextromethorphan-guaiFENesin  mg/5 ml (ROBITUSSIN-DM)  mg/5 mL liquid; Take 5 mLs by mouth every 6 (six) hours as needed (Cough).  Dispense: 236 mL; Refill: 0      INSTRUCTIONS  Medications as prescribed.  Rest.  Increase oral fluids.  Follow up with your doctor as advised.  Quarantine per CDC guidelines as discussed.  To ER for worsening of symptoms, or for any new symptoms as discussed.

## 2025-03-06 ENCOUNTER — NURSE TRIAGE (OUTPATIENT)
Dept: ADMINISTRATIVE | Facility: CLINIC | Age: 75
End: 2025-03-06
Payer: MEDICARE

## 2025-03-06 NOTE — TELEPHONE ENCOUNTER
Pt responded to text message about covid and he said that he is doing better and had a few questions that were answered and to call back or respond to the message. Pt said feeling better.PCP is non [facility] no routing     Reason for Disposition   Health information question, no triage required and triager able to answer question    Protocols used: Information Only Call - No Triage-A-OH

## 2025-07-27 ENCOUNTER — HOSPITAL ENCOUNTER (EMERGENCY)
Facility: HOSPITAL | Age: 75
Discharge: HOME | End: 2025-07-27
Attending: EMERGENCY MEDICINE
Payer: MEDICARE

## 2025-07-27 VITALS
HEART RATE: 71 BPM | HEIGHT: 70 IN | BODY MASS INDEX: 24.34 KG/M2 | OXYGEN SATURATION: 96 % | DIASTOLIC BLOOD PRESSURE: 76 MMHG | TEMPERATURE: 97 F | WEIGHT: 170 LBS | SYSTOLIC BLOOD PRESSURE: 155 MMHG | RESPIRATION RATE: 18 BRPM

## 2025-07-27 DIAGNOSIS — M79.605 LEFT LEG PAIN: Primary | ICD-10-CM

## 2025-07-27 LAB
ABSOLUTE EOSINOPHIL (SMH): 0.26 K/UL
ABSOLUTE MONOCYTE (SMH): 0.81 K/UL (ref 0.3–1)
ABSOLUTE NEUTROPHIL COUNT (SMH): 6.1 K/UL (ref 1.8–7.7)
ALBUMIN SERPL-MCNC: 3.2 G/DL (ref 3.5–5.2)
ALP SERPL-CCNC: 100 UNIT/L (ref 40–150)
ALT SERPL-CCNC: 11 UNIT/L (ref 10–44)
ANION GAP (SMH): 9 MMOL/L (ref 8–16)
AST SERPL-CCNC: 18 UNIT/L (ref 11–45)
BASOPHILS # BLD AUTO: 0.02 K/UL
BASOPHILS NFR BLD AUTO: 0.2 %
BILIRUB SERPL-MCNC: 0.8 MG/DL (ref 0.1–1)
BUN SERPL-MCNC: 16 MG/DL (ref 8–23)
CALCIUM SERPL-MCNC: 8.9 MG/DL (ref 8.7–10.5)
CHLORIDE SERPL-SCNC: 106 MMOL/L (ref 95–110)
CK SERPL-CCNC: 29 U/L (ref 20–200)
CO2 SERPL-SCNC: 25 MMOL/L (ref 23–29)
CREAT SERPL-MCNC: 1 MG/DL (ref 0.5–1.4)
ERYTHROCYTE [DISTWIDTH] IN BLOOD BY AUTOMATED COUNT: 12.6 % (ref 11.5–14.5)
GFR SERPLBLD CREATININE-BSD FMLA CKD-EPI: >60 ML/MIN/1.73/M2
GLUCOSE SERPL-MCNC: 144 MG/DL (ref 70–110)
HCT VFR BLD AUTO: 40.6 % (ref 40–54)
HGB BLD-MCNC: 13.7 GM/DL (ref 14–18)
IMM GRANULOCYTES # BLD AUTO: 0.03 K/UL (ref 0–0.04)
IMM GRANULOCYTES NFR BLD AUTO: 0.4 % (ref 0–0.5)
LYMPHOCYTES # BLD AUTO: 0.86 K/UL (ref 1–4.8)
MAGNESIUM SERPL-MCNC: 2 MG/DL (ref 1.6–2.6)
MCH RBC QN AUTO: 30.4 PG (ref 27–31)
MCHC RBC AUTO-ENTMCNC: 33.7 G/DL (ref 32–36)
MCV RBC AUTO: 90 FL (ref 82–98)
NUCLEATED RBC (/100WBC) (SMH): 0 /100 WBC
PLATELET # BLD AUTO: 203 K/UL (ref 150–450)
PMV BLD AUTO: 9 FL (ref 9.2–12.9)
POTASSIUM SERPL-SCNC: 4 MMOL/L (ref 3.5–5.1)
PROT SERPL-MCNC: 6.6 GM/DL (ref 6–8.4)
RBC # BLD AUTO: 4.51 M/UL (ref 4.6–6.2)
RELATIVE EOSINOPHIL (SMH): 3.2 % (ref 0–8)
RELATIVE LYMPHOCYTE (SMH): 10.7 % (ref 18–48)
RELATIVE MONOCYTE (SMH): 10 % (ref 4–15)
RELATIVE NEUTROPHIL (SMH): 75.5 % (ref 38–73)
SODIUM SERPL-SCNC: 140 MMOL/L (ref 136–145)
WBC # BLD AUTO: 8.07 K/UL (ref 3.9–12.7)

## 2025-07-27 PROCEDURE — 99284 EMERGENCY DEPT VISIT MOD MDM: CPT | Mod: 25

## 2025-07-27 PROCEDURE — 82550 ASSAY OF CK (CPK): CPT | Performed by: EMERGENCY MEDICINE

## 2025-07-27 PROCEDURE — 85025 COMPLETE CBC W/AUTO DIFF WBC: CPT | Performed by: EMERGENCY MEDICINE

## 2025-07-27 PROCEDURE — 36415 COLL VENOUS BLD VENIPUNCTURE: CPT | Performed by: EMERGENCY MEDICINE

## 2025-07-27 PROCEDURE — 83735 ASSAY OF MAGNESIUM: CPT | Performed by: EMERGENCY MEDICINE

## 2025-07-27 PROCEDURE — 96361 HYDRATE IV INFUSION ADD-ON: CPT

## 2025-07-27 PROCEDURE — 63600175 PHARM REV CODE 636 W HCPCS: Performed by: EMERGENCY MEDICINE

## 2025-07-27 PROCEDURE — 96372 THER/PROPH/DIAG INJ SC/IM: CPT | Performed by: EMERGENCY MEDICINE

## 2025-07-27 PROCEDURE — 80053 COMPREHEN METABOLIC PANEL: CPT | Performed by: EMERGENCY MEDICINE

## 2025-07-27 PROCEDURE — 96374 THER/PROPH/DIAG INJ IV PUSH: CPT

## 2025-07-27 RX ORDER — KETOROLAC TROMETHAMINE 30 MG/ML
15 INJECTION, SOLUTION INTRAMUSCULAR; INTRAVENOUS
Status: COMPLETED | OUTPATIENT
Start: 2025-07-27 | End: 2025-07-27

## 2025-07-27 RX ORDER — ORPHENADRINE CITRATE 30 MG/ML
60 INJECTION INTRAMUSCULAR; INTRAVENOUS
Status: COMPLETED | OUTPATIENT
Start: 2025-07-27 | End: 2025-07-27

## 2025-07-27 RX ORDER — METHOCARBAMOL 500 MG/1
1000 TABLET, FILM COATED ORAL 3 TIMES DAILY PRN
Qty: 20 TABLET | Refills: 0 | Status: SHIPPED | OUTPATIENT
Start: 2025-07-27 | End: 2025-08-01

## 2025-07-27 RX ORDER — DICLOFENAC SODIUM 50 MG/1
50 TABLET, DELAYED RELEASE ORAL 3 TIMES DAILY PRN
Qty: 15 TABLET | Refills: 0 | Status: SHIPPED | OUTPATIENT
Start: 2025-07-27

## 2025-07-27 RX ADMIN — KETOROLAC TROMETHAMINE 15 MG: 30 INJECTION, SOLUTION INTRAMUSCULAR; INTRAVENOUS at 01:07

## 2025-07-27 RX ADMIN — ORPHENADRINE CITRATE 60 MG: 60 INJECTION INTRAMUSCULAR; INTRAVENOUS at 12:07

## 2025-07-27 RX ADMIN — SODIUM CHLORIDE, POTASSIUM CHLORIDE, SODIUM LACTATE AND CALCIUM CHLORIDE 1000 ML: 600; 310; 30; 20 INJECTION, SOLUTION INTRAVENOUS at 12:07

## 2025-07-27 NOTE — ED PROVIDER NOTES
Encounter Date: 7/27/2025       History     Chief Complaint   Patient presents with    Leg Pain     Spasm in left calf     75-year-old male with a past medical history of reflux disease presents for cramping to the left lower leg.  He reports that it started approximately a week ago and has been intermittent since that time.  He reports that he initially had cramping in both lower legs but that the right leg has resolved.  He reports his symptoms has been going back and forth between both legs but only in the left leg for the last few days.  He reports his cramping improved after taking ibuprofen this past Thursday and Friday but came back again yesterday.  He denies any associated trauma, chest pain, shortness of breath, abdominal pain, nausea/vomiting, or diarrhea.  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  The patient reports that he really does not drink much water daily as well.  There are no aggravating factors.      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 Blanchard, in legacy: rectal colon polyp removed, erythema to rectum; biopsy report in legacy    COLONOSCOPY N/A 2/5/2019    Procedure: COLONOSCOPY;  Surgeon: Rita Hutchins MD;  Location: [facility];  Service: Endoscopy;  Laterality: N/A;    COLONOSCOPY N/A 9/21/2023    Procedure: COLONOSCOPY;  Surgeon: Rita Hutchins MD;  Location: [facility];  Service: Endoscopy;  Laterality: N/A;    FLEXIBLE SIGMOIDOSCOPY N/A 3/20/2019    Procedure: SIGMOIDOSCOPY, FLEXIBLE;  Surgeon: Rita Hutchins MD;  Location: [facility];  Service: Endoscopy;  Laterality: N/A;    HERNIA REPAIR      ORCHIECTOMY      TONSILLECTOMY       Family History   Problem Relation Name Age of Onset    Arthritis Mother       Hearing loss Mother      Vision loss Mother      Cancer Father      Colon cancer Neg Hx      Ulcerative colitis Neg Hx      Stomach cancer Neg Hx      Esophageal cancer Neg Hx      Crohn's disease Neg Hx       Social History[1]  Review of Systems   Constitutional:  Negative for chills, diaphoresis, fatigue and fever.   HENT:  Negative for congestion and rhinorrhea.    Respiratory:  Negative for cough and shortness of breath.    Cardiovascular:  Negative for chest pain.   Gastrointestinal:  Negative for abdominal pain, diarrhea, nausea and vomiting.   Genitourinary:  Negative for dysuria, frequency and testicular pain.   Musculoskeletal:  Positive for myalgias. Negative for gait problem.   Skin:  Negative for color change.   Neurological:  Negative for dizziness and numbness.   Psychiatric/Behavioral:  Negative for agitation and confusion.        Physical Exam     Initial Vitals [07/27/25 1218]   BP Pulse Resp Temp SpO2   (!) 144/76 97 18 97.3 °F (36.3 °C) 97 %      MAP       --         Physical Exam    Nursing note and vitals reviewed.  Constitutional: He appears well-developed and well-nourished.   HENT:   Head: Normocephalic and atraumatic.   Eyes: EOM are normal. Pupils are equal, round, and reactive to light.   Neck: Neck supple.   Cardiovascular:  Normal rate and regular rhythm.           Pulmonary/Chest: Breath sounds normal.   Abdominal: Abdomen is soft. Bowel sounds are normal. He exhibits no distension. There is no abdominal tenderness. There is no rebound and no guarding.   Musculoskeletal:         General: No tenderness or edema. Normal range of motion.      Cervical back: Neck supple.      Comments: No tenderness, edema, skin color changes, skin lesions, or ecchymosis noted to the left lower leg.  Skin is warm and dry.     Neurological: He is alert and oriented to person, place, and time.   Skin: Skin is warm and dry.   Psychiatric: He has a normal mood and affect.         ED Course   Procedures  Labs  Reviewed   COMPREHENSIVE METABOLIC PANEL - Abnormal       Result Value    Sodium 140      Potassium 4.0      Chloride 106      CO2 25      Glucose 144 (*)     BUN 16      Creatinine 1.0      Calcium 8.9      Protein Total 6.6      Albumin 3.2 (*)     Bilirubin Total 0.8            AST 18      ALT 11      Anion Gap 9      eGFR >60     CBC WITH DIFFERENTIAL - Abnormal    WBC 8.07      RBC 4.51 (*)     Hgb 13.7 (*)     Hct 40.6      MCV 90      MCH 30.4      MCHC 33.7      RDW 12.6      Platelet Count 203      MPV 9.0 (*)     Nucleated RBC 0      Neut % 75.5 (*)     Lymph % 10.7 (*)     Mono % 10.0      Eos % 3.2      Basophil % 0.2      Imm Grans % 0.4      Neut # 6.1      Lymph # 0.86 (*)     Mono # 0.81      Eos # 0.26      Baso # 0.02      Imm Grans # 0.03     MAGNESIUM - Normal    Magnesium 2.0     CK - Normal    CPK 29     CBC W/ AUTO DIFFERENTIAL    Narrative:     The following orders were created for panel order Complete Blood Count (CBC).  Procedure                               Abnormality         Status                     ---------                               -----------         ------                     CBC with Differential[[id number]]       Abnormal            Final result                 Please view results for these tests on the individual orders.          Imaging Results    None          Medications   orphenadrine injection 60 mg (60 mg Intramuscular Given 7/27/25 1251)   lactated ringers bolus 1,000 mL (0 mLs Intravenous Stopped 7/27/25 1347)   ketorolac injection 15 mg (15 mg Intravenous Given 7/27/25 1357)     Medical Decision Making  75-year-old male who presents for cramping to the left lower leg.    Initial differential diagnosis included but not limited to electrolyte abnormality, heat exposure, and rhabdomyolysis.    Amount and/or Complexity of Data Reviewed  Labs: ordered.    Risk  Prescription drug management.  Risk Details: The patient was emergently evaluated in the emergency  department, his evaluation was significant for an elderly male with a benign exam noted.  The patient's labs showed no acute abnormalities.  The patient was treated here with IV fluids, IV Toradol, and parental Norflex.  He reports some improvement in his symptoms with treatment.  I believe the etiology of his symptoms is likely mild dehydration and heat exposure.  I doubt an acute DVT, as the patient's symptoms has been going back and forth between both legs, as reported by the patient and his family member.  I do not believe that he needs any radiologic imaging at this time.  The patient likely has musculoskeletal pain and mild dehydration from his heat exposure .  He is stable for discharge to home, and does not require further care or workup at this time.  He will be discharged home with p.o. diclofenac and p.o. Robaxin.  He is referred to primary care for follow-up.  He is encouraged to increase his p.o. water intake as well.                                          Clinical Impression:  Final diagnoses:  [M79.605] Left leg pain (Primary)          ED Disposition Condition    Discharge Stable          ED Prescriptions       Medication Sig Dispense Start Date End Date Auth. Provider    diclofenac (VOLTAREN) 50 MG EC tablet Take 1 tablet (50 mg total) by mouth 3 (three) times daily as needed (pain). 15 tablet 7/27/2025 -- Feroz Jones MD    methocarbamoL (ROBAXIN) 500 MG Tab Take 2 tablets (1,000 mg total) by mouth 3 (three) times daily as needed (pain). 20 tablet 7/27/2025 8/1/2025 Feroz Jones MD          Follow-up Information       Follow up With Specialties Details Why Contact Nazanin Dimas FNP Family Medicine Schedule an appointment as soon as possible for a visit   [address]  [place] [address] [phone]                     [1]   Social History  Tobacco Use    Smoking status: Never    Smokeless tobacco: Never   Substance Use Topics    Alcohol use: No    Drug  use: No        Feroz Jones MD  07/27/25 154

## 2025-08-01 ENCOUNTER — TELEPHONE (OUTPATIENT)
Facility: CLINIC | Age: 75
End: 2025-08-01
Payer: MEDICARE

## 2025-08-01 DIAGNOSIS — I82.4Y2 ACUTE DEEP VEIN THROMBOSIS (DVT) OF PROXIMAL END OF LEFT LOWER EXTREMITY: Primary | ICD-10-CM

## 2025-08-01 NOTE — NURSING
Oncology Navigation   Intake  Date of Diagnosis: 08/01/25  Cancer Type: Benign hem  Type of Referral: Internal  Date of Referral: 08/01/25  Initial Nurse Navigator Contact: 08/01/25  Referral to Initial Contact Timeline (days): 0  First Appointment Available: 08/11/25  Appointment Date: 08/11/25  First Available Date vs. Scheduled Date (days): 0  Reason if booked > 7 days after scheduling: Patient request     Treatment                              Acuity      Follow Up  No follow-ups on file.     Pt scheduled for new pt Hematology/Oncology clinic appt as requested in referral received in workqueue. Appt date, time and location reviewed with the pt. No questions at this time.

## 2025-08-11 ENCOUNTER — OFFICE VISIT (OUTPATIENT)
Facility: CLINIC | Age: 75
End: 2025-08-11
Payer: MEDICARE

## 2025-08-11 VITALS
SYSTOLIC BLOOD PRESSURE: 143 MMHG | HEART RATE: 91 BPM | DIASTOLIC BLOOD PRESSURE: 80 MMHG | BODY MASS INDEX: 25.64 KG/M2 | TEMPERATURE: 98 F | WEIGHT: 179.13 LBS | OXYGEN SATURATION: 97 % | RESPIRATION RATE: 17 BRPM | HEIGHT: 70 IN

## 2025-08-11 DIAGNOSIS — I82.4Y2 ACUTE DEEP VEIN THROMBOSIS (DVT) OF PROXIMAL END OF LEFT LOWER EXTREMITY: ICD-10-CM

## 2025-08-11 DIAGNOSIS — I82.412 ACUTE EMBOLISM AND THROMBOSIS OF LEFT FEMORAL VEIN: ICD-10-CM

## 2025-08-11 PROCEDURE — 3077F SYST BP >= 140 MM HG: CPT | Mod: CPTII,S$GLB,, | Performed by: INTERNAL MEDICINE

## 2025-08-11 PROCEDURE — G2211 COMPLEX E/M VISIT ADD ON: HCPCS | Mod: S$GLB,,, | Performed by: INTERNAL MEDICINE

## 2025-08-11 PROCEDURE — 99999 PR PBB SHADOW E&M-EST. PATIENT-LVL IV: CPT | Mod: PBBFAC,,, | Performed by: INTERNAL MEDICINE

## 2025-08-11 PROCEDURE — 1126F AMNT PAIN NOTED NONE PRSNT: CPT | Mod: CPTII,S$GLB,, | Performed by: INTERNAL MEDICINE

## 2025-08-11 PROCEDURE — 1101F PT FALLS ASSESS-DOCD LE1/YR: CPT | Mod: CPTII,S$GLB,, | Performed by: INTERNAL MEDICINE

## 2025-08-11 PROCEDURE — 3079F DIAST BP 80-89 MM HG: CPT | Mod: CPTII,S$GLB,, | Performed by: INTERNAL MEDICINE

## 2025-08-11 PROCEDURE — 3288F FALL RISK ASSESSMENT DOCD: CPT | Mod: CPTII,S$GLB,, | Performed by: INTERNAL MEDICINE

## 2025-08-11 PROCEDURE — 1159F MED LIST DOCD IN RCRD: CPT | Mod: CPTII,S$GLB,, | Performed by: INTERNAL MEDICINE

## 2025-08-11 PROCEDURE — 99205 OFFICE O/P NEW HI 60 MIN: CPT | Mod: S$GLB,,, | Performed by: INTERNAL MEDICINE

## 2025-08-11 RX ORDER — APIXABAN 5 MG (74)
KIT ORAL
COMMUNITY
Start: 2025-07-31 | End: 2025-08-11

## 2025-08-28 ENCOUNTER — TELEPHONE (OUTPATIENT)
Facility: CLINIC | Age: 75
End: 2025-08-28
Payer: MEDICARE

## 2025-08-29 ENCOUNTER — TELEPHONE (OUTPATIENT)
Facility: CLINIC | Age: 75
End: 2025-08-29
Payer: MEDICARE